# Patient Record
Sex: FEMALE | Race: WHITE | URBAN - METROPOLITAN AREA
[De-identification: names, ages, dates, MRNs, and addresses within clinical notes are randomized per-mention and may not be internally consistent; named-entity substitution may affect disease eponyms.]

---

## 2018-01-18 ENCOUNTER — TRANSFERRED RECORDS (OUTPATIENT)
Dept: HEALTH INFORMATION MANAGEMENT | Facility: CLINIC | Age: 83
End: 2018-01-18

## 2018-01-19 PROCEDURE — 00000346 ZZHCL STATISTIC REVIEW OUTSIDE SLIDES TC 88321: Performed by: INTERNAL MEDICINE

## 2018-01-22 LAB — COPATH REPORT: NORMAL

## 2018-01-30 ENCOUNTER — OFFICE VISIT (OUTPATIENT)
Dept: PULMONOLOGY | Facility: CLINIC | Age: 83
End: 2018-01-30
Attending: INTERNAL MEDICINE
Payer: MEDICARE

## 2018-01-30 VITALS
SYSTOLIC BLOOD PRESSURE: 184 MMHG | RESPIRATION RATE: 16 BRPM | BODY MASS INDEX: 23.19 KG/M2 | HEIGHT: 62 IN | HEART RATE: 71 BPM | OXYGEN SATURATION: 96 % | WEIGHT: 126 LBS | DIASTOLIC BLOOD PRESSURE: 83 MMHG

## 2018-01-30 DIAGNOSIS — J47.9 BRONCHIECTASIS WITHOUT COMPLICATION (H): Primary | ICD-10-CM

## 2018-01-30 DIAGNOSIS — J47.9 BRONCHIECTASIS WITHOUT COMPLICATION (H): ICD-10-CM

## 2018-01-30 DIAGNOSIS — R05.9 COUGH: Primary | ICD-10-CM

## 2018-01-30 LAB
6 MIN WALK (FT): 1030 FT
6 MIN WALK (M): 314 M
BASOPHILS # BLD AUTO: 0 10E9/L (ref 0–0.2)
BASOPHILS NFR BLD AUTO: 0.5 %
CK SERPL-CCNC: 80 U/L (ref 30–225)
CRP SERPL-MCNC: 8.5 MG/L (ref 0–8)
DIFFERENTIAL METHOD BLD: NORMAL
EOSINOPHIL # BLD AUTO: 0.1 10E9/L (ref 0–0.7)
EOSINOPHIL NFR BLD AUTO: 1.2 %
ERYTHROCYTE [DISTWIDTH] IN BLOOD BY AUTOMATED COUNT: 14.8 % (ref 10–15)
ERYTHROCYTE [SEDIMENTATION RATE] IN BLOOD BY WESTERGREN METHOD: 61 MM/H (ref 0–30)
HCT VFR BLD AUTO: 36.9 % (ref 35–47)
HGB BLD-MCNC: 11.7 G/DL (ref 11.7–15.7)
IMM GRANULOCYTES # BLD: 0 10E9/L (ref 0–0.4)
IMM GRANULOCYTES NFR BLD: 0.3 %
LYMPHOCYTES # BLD AUTO: 1.6 10E9/L (ref 0.8–5.3)
LYMPHOCYTES NFR BLD AUTO: 21.8 %
MCH RBC QN AUTO: 28.1 PG (ref 26.5–33)
MCHC RBC AUTO-ENTMCNC: 31.7 G/DL (ref 31.5–36.5)
MCV RBC AUTO: 89 FL (ref 78–100)
MONOCYTES # BLD AUTO: 0.6 10E9/L (ref 0–1.3)
MONOCYTES NFR BLD AUTO: 7.5 %
NEUTROPHILS # BLD AUTO: 5 10E9/L (ref 1.6–8.3)
NEUTROPHILS NFR BLD AUTO: 68.7 %
NRBC # BLD AUTO: 0 10*3/UL
NRBC BLD AUTO-RTO: 0 /100
PLATELET # BLD AUTO: 224 10E9/L (ref 150–450)
RBC # BLD AUTO: 4.16 10E12/L (ref 3.8–5.2)
WBC # BLD AUTO: 7.3 10E9/L (ref 4–11)

## 2018-01-30 PROCEDURE — 87449 NOS EACH ORGANISM AG IA: CPT | Performed by: INTERNAL MEDICINE

## 2018-01-30 PROCEDURE — 86140 C-REACTIVE PROTEIN: CPT | Performed by: INTERNAL MEDICINE

## 2018-01-30 PROCEDURE — 82784 ASSAY IGA/IGD/IGG/IGM EACH: CPT | Performed by: INTERNAL MEDICINE

## 2018-01-30 PROCEDURE — 86235 NUCLEAR ANTIGEN ANTIBODY: CPT | Performed by: INTERNAL MEDICINE

## 2018-01-30 PROCEDURE — 86038 ANTINUCLEAR ANTIBODIES: CPT | Performed by: INTERNAL MEDICINE

## 2018-01-30 PROCEDURE — 86480 TB TEST CELL IMMUN MEASURE: CPT | Performed by: INTERNAL MEDICINE

## 2018-01-30 PROCEDURE — 86001 ALLERGEN SPECIFIC IGG: CPT | Performed by: INTERNAL MEDICINE

## 2018-01-30 PROCEDURE — 86431 RHEUMATOID FACTOR QUANT: CPT | Performed by: INTERNAL MEDICINE

## 2018-01-30 PROCEDURE — 86606 ASPERGILLUS ANTIBODY: CPT | Mod: 91 | Performed by: INTERNAL MEDICINE

## 2018-01-30 PROCEDURE — 82550 ASSAY OF CK (CPK): CPT | Performed by: INTERNAL MEDICINE

## 2018-01-30 PROCEDURE — 85025 COMPLETE CBC W/AUTO DIFF WBC: CPT | Performed by: INTERNAL MEDICINE

## 2018-01-30 PROCEDURE — 87305 ASPERGILLUS AG IA: CPT | Performed by: INTERNAL MEDICINE

## 2018-01-30 PROCEDURE — 82785 ASSAY OF IGE: CPT | Performed by: INTERNAL MEDICINE

## 2018-01-30 PROCEDURE — 82787 IGG 1 2 3 OR 4 EACH: CPT | Performed by: INTERNAL MEDICINE

## 2018-01-30 PROCEDURE — 85652 RBC SED RATE AUTOMATED: CPT | Performed by: INTERNAL MEDICINE

## 2018-01-30 PROCEDURE — 36415 COLL VENOUS BLD VENIPUNCTURE: CPT | Performed by: INTERNAL MEDICINE

## 2018-01-30 PROCEDURE — 86331 IMMUNODIFFUSION OUCHTERLONY: CPT | Performed by: INTERNAL MEDICINE

## 2018-01-30 PROCEDURE — 86003 ALLG SPEC IGE CRUDE XTRC EA: CPT | Performed by: INTERNAL MEDICINE

## 2018-01-30 PROCEDURE — 86255 FLUORESCENT ANTIBODY SCREEN: CPT | Performed by: INTERNAL MEDICINE

## 2018-01-30 PROCEDURE — 86635 COCCIDIOIDES ANTIBODY: CPT | Performed by: INTERNAL MEDICINE

## 2018-01-30 PROCEDURE — 86612 BLASTOMYCES ANTIBODY: CPT | Performed by: INTERNAL MEDICINE

## 2018-01-30 PROCEDURE — 82085 ASSAY OF ALDOLASE: CPT | Performed by: INTERNAL MEDICINE

## 2018-01-30 PROCEDURE — 86256 FLUORESCENT ANTIBODY TITER: CPT | Performed by: INTERNAL MEDICINE

## 2018-01-30 PROCEDURE — G0463 HOSPITAL OUTPT CLINIC VISIT: HCPCS | Mod: ZF

## 2018-01-30 PROCEDURE — 86200 CCP ANTIBODY: CPT | Performed by: INTERNAL MEDICINE

## 2018-01-30 PROCEDURE — 86698 HISTOPLASMA ANTIBODY: CPT | Performed by: INTERNAL MEDICINE

## 2018-01-30 PROCEDURE — 86606 ASPERGILLUS ANTIBODY: CPT | Performed by: INTERNAL MEDICINE

## 2018-01-30 RX ORDER — ALBUTEROL SULFATE 90 UG/1
2 AEROSOL, METERED RESPIRATORY (INHALATION) EVERY 6 HOURS
COMMUNITY

## 2018-01-30 RX ORDER — CALCIUM CARBONATE 500(1250)
1 TABLET ORAL 3 TIMES DAILY
COMMUNITY

## 2018-01-30 RX ORDER — DESOXIMETASONE 0.5 MG/G
GEL TOPICAL PRN
COMMUNITY

## 2018-01-30 RX ORDER — AZITHROMYCIN 250 MG/1
TABLET, FILM COATED ORAL
Qty: 6 TABLET | Refills: 0 | Status: SHIPPED | OUTPATIENT
Start: 2018-01-30 | End: 2018-03-08

## 2018-01-30 ASSESSMENT — PAIN SCALES - GENERAL: PAINLEVEL: NO PAIN (0)

## 2018-01-30 NOTE — PROGRESS NOTES
Reason for Visit  Ayala Dawn is a 85 year old year old female who is being seen for Consult (ILD)    ILD HPI    Aayla Dawn is an 85-year-old female seen today for possible interstitial lung disease.  Her history is that she presented 1 year ago with episode of what sounds like quite significant hemoptysis.  This was on February 28 of 2017.  She was admitted to the hospital and underwent evaluation for this.  CT scan at that time showed some patchy upper lobe infiltrates with some cystic lesions/bronchiectasis in the right upper lobe.  She underwent bronchoscopy which apparently did show some blood from the right upper lobe however the cultures were all negative.  The patient was on Plavix at this time which she continues.  She was also found to have 2 small peripheral pulmonary emboli for which she was briefly placed on the Lovenox however this was discontinued and she did not receive a full course of anticoagulation.  Because of the concern of possible fungal infection she was also given a course of voriconazole although it is unclear how long she was treated for it does not sound like it was more than 1-2 weeks at most.  She also was given a course of prednisone started at 50 mg and tapered off over about 2 months.  Her CT scan continued to show abnormalities and so ultimately she underwent a surgical lung biopsy which demonstrated necrotizing granulomatous inflammation with no evidence of fungal or mycobacterial organisms on stains.  Since then the patient has not had any recurrence of her hemoptysis.  She has had minimal cough although she does note over the last couple weeks it is increased and productive of yellowish turning to green sputum.  She has dyspnea with any significant exertion.  She is not hypoxic and does not require oxygen.  She also notes significant fatigue again this has been present for about 1 year has not really improved.  She was given an albuterol inhaler which she used rarely and  does not feel it provided any significant relief.    Past medical history is reviewed with the patient she has hypertension and is on Plavix for possible small cerebrovascular lesion.  She has history of gallbladder resection hysterectomy and appendectomy.  She does note that as a child she had bronchitis every winter.  She grew up in [place] during the war.    Social history the patient currently lives in a house in [place] with her  there are no pets or birds in the home.  There is no hot tub exposure.  She has about a 20-pack-year history of smoking she quit smoking in 1973.  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  Also as mentioned above grew up in [place] during the war and was exposed to vomiting.  No other obvious exposures.      Current Outpatient Prescriptions   Medication     albuterol (PROAIR HFA/PROVENTIL HFA/VENTOLIN HFA) 108 (90 BASE) MCG/ACT Inhaler     METOPROLOL SUCCINATE ER PO     RANITIDINE HCL PO     Cyanocobalamin (B-12) 1000 MCG CAPS     Ferrous Sulfate (IRON SUPPLEMENT PO)     ATORVASTATIN CALCIUM PO     desoximetasone (TOPICORT) 0.05 % GEL     CLOPIDOGREL BISULFATE PO     calcium carbonate (OS-VIDAL 500 MG [place]. CA) 1250 MG tablet     MAGNESIUM OXIDE PO     azithromycin (ZITHROMAX) 250 MG tablet     No current facility-administered medications for this visit.      Allergies   Allergen Reactions     Phenobarbital Hives     Past Medical History:   Diagnosis Date     HTN (hypertension)        Past Surgical History:   Procedure Laterality Date     APPENDECTOMY       choleycystectomy       HYSTERECTOMY         Social History     Social History     Marital status:      Spouse name: N/A     Number of children: N/A     Years of education: N/A     Occupational History     Not on file.     Social History Main Topics     Smoking status: Former Smoker     Quit date: 1/1/1973     Smokeless tobacco:  "Never Used     Alcohol use Yes      Comment: rare     Drug use: No     Sexual activity: Not on file     Other Topics Concern     Not on file     Social History Narrative     No narrative on file       Family History   Problem Relation Age of Onset     Asthma Mother        ROS Pulmonary    A complete ROS was otherwise negative except as noted in the HPI.  Vitals:    01/30/18 1440   BP: 184/83   Pulse: 71   Resp: 16   SpO2: 96%   Weight: 57.2 kg (126 lb)   Height: 1.575 m (5' 2\")     Exam:   GENERAL APPEARANCE: Well developed, well nourished, alert, and in no apparent distress.  NECK: supple, no masses, no thyromegaly.  LYMPHATICS: No significant axillary, cervical, or supraclavicular nodes.  RESP: decreased air entry with diffuse crackles, no wheezing  CV: Normal S1, S2, regular rhythm, normal rate, no rub, no murmur,  no gallop, no LE edema.   ABDOMEN:  Bowel sounds normal, soft, nontender, no HSM or masses.   MS: extremities normal- no clubbing, no cyanosis.  SKIN: no rash on limited exam  NEURO: Mentation intact, speech normal, normal strength and tone, normal gait and stance  PSYCH: mentation appears normal. and affect normal/bright  Results: I have reviewed all imaging, PFTs and other relavent tests, please see below for details, PFT and imaging results were reviewed with the patient.  PFTs: Mild to moderate restriction, normal DLCO, appears stable from 4/2017  Chest CT: Several chest CTs reviewed by me demonstrate mostly upper lobe patchy migratory infiltrates with fairly extensive cystic bronchiectasis in the right upper lobe.  This appears to be relatively stable over the past 6 months.  Assessment and plan:     85-year-old female with episode of hemoptysis right upper lobe cystic bronchiectasis and some patchy infiltrates.  With a biopsy showing necrotizing granulomatous inflammation.  Currently she is moderately symptomatic with a cough that is productive although only the last couple weeks.  She has " significant dyspnea on exertion.  The etiology of her lung disease is obviously not clear at this point however I am most concerned still about an infectious process despite the negative workup.  We will do a standard I will do workup as well as a serologic evaluation for infection including fungal and mycobacterial infection.  We will obtain the biopsy slides and will review her case in our multidisciplinary ILD conference.  As I discussed with the patient things do appear relatively stable and therefore I do not think there is any urgency.  We will await the results of the workup however moving forward I think that possible courses of action would include repeat bronchoscopy to further evaluate for infection versus empiric course of antifungal therapy versus close observation.  I did inform the patient that if she were to develop significant hemoptysis again that she should go to the hospital and she should be transferred to the [facility] for consideration of IR embolization of her right upper lobe.    We will obtain the above-mentioned studies and get her pathology and review her case in our ILD conference and I will follow-up with the patient after that.  We will schedule her for 2 month follow-up with pulmonary function tests where we can review the results of her evaluation at that time.  I also will give the patient a cup for a sputum sample to check for routine fungal and AFB given the change in her sputum recently.  Also will have her take a course of azithromycin after she has produced a sputum culture.    CBC   No results for input(s): RBC, HGB, HCT, PLT in the last 76432 hours.    Invalid input(s): WBS    Basic Metabolic Panel  No results for input(s): NA, POTASSIUM, CHLORIDE, CO2, BUN, CT, GLC, VIDAL in the last 70234 hours.    INR  No results for input(s): INR in the last 70405 hours.    PFT  PFT Latest Ref Rng & Units 1/30/2018   FVC L 1.43   FEV1 L 1.06   FVC% % 63   FEV1% %  62           CC:

## 2018-01-30 NOTE — LETTER
1/30/2018       RE: Ayala Dawn  [address]  [facility] [address]     Dear Colleague,    Thank you for referring your patient, Ayala Dawn, to the [facility] at [facility]. Please see a copy of my visit note below.    Reason for Visit  Ayala Dawn is a 85 year old year old female who is being seen for Consult (ILD)    ILD HPI    Ayala Dawn is an 85-year-old female seen today for possible interstitial lung disease.  Her history is that she presented 1 year ago with episode of what sounds like quite significant hemoptysis.  This was on February 28 of 2017.  She was admitted to the hospital and underwent evaluation for this.  CT scan at that time showed some patchy upper lobe infiltrates with some cystic lesions/bronchiectasis in the right upper lobe.  She underwent bronchoscopy which apparently did show some blood from the right upper lobe however the cultures were all negative.  The patient was on Plavix at this time which she continues.  She was also found to have 2 small peripheral pulmonary emboli for which she was briefly placed on the Lovenox however this was discontinued and she did not receive a full course of anticoagulation.  Because of the concern of possible fungal infection she was also given a course of voriconazole although it is unclear how long she was treated for it does not sound like it was more than 1-2 weeks at most.  She also was given a course of prednisone started at 50 mg and tapered off over about 2 months.  Her CT scan continued to show abnormalities and so ultimately she underwent a surgical lung biopsy which demonstrated necrotizing granulomatous inflammation with no evidence of fungal or mycobacterial organisms on stains.  Since then the patient has not had any recurrence of her hemoptysis.  She has had minimal cough although she does note over the last couple weeks it is increased and  productive of yellowish turning to green sputum.  She has dyspnea with any significant exertion.  She is not hypoxic and does not require oxygen.  She also notes significant fatigue again this has been present for about 1 year has not really improved.  She was given an albuterol inhaler which she used rarely and does not feel it provided any significant relief.    Past medical history is reviewed with the patient she has hypertension and is on Plavix for possible small cerebrovascular lesion.  She has history of gallbladder resection hysterectomy and appendectomy.  She does note that as a child she had bronchitis every winter.  She grew up in [place] during the war.    Social history the patient currently lives in a house in [place] with her  there are no pets or birds in the home.  There is no hot tub exposure.  She has about a 20-pack-year history of smoking she quit smoking in 1973.  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  Also as mentioned above grew up in [place] during the war and was exposed to vomiting.  No other obvious exposures.      Current Outpatient Prescriptions   Medication     albuterol (PROAIR HFA/PROVENTIL HFA/VENTOLIN HFA) 108 (90 BASE) MCG/ACT Inhaler     METOPROLOL SUCCINATE ER PO     RANITIDINE HCL PO     Cyanocobalamin (B-12) 1000 MCG CAPS     Ferrous Sulfate (IRON SUPPLEMENT PO)     ATORVASTATIN CALCIUM PO     desoximetasone (TOPICORT) 0.05 % GEL     CLOPIDOGREL BISULFATE PO     calcium carbonate (OS-VIDAL 500 MG [place]. CA) 1250 MG tablet     MAGNESIUM OXIDE PO     azithromycin (ZITHROMAX) 250 MG tablet     No current facility-administered medications for this visit.      Allergies   Allergen Reactions     Phenobarbital Hives     Past Medical History:   Diagnosis Date     HTN (hypertension)        Past Surgical History:   Procedure Laterality Date     APPENDECTOMY       choleycystectomy        "HYSTERECTOMY         Social History     Social History     Marital status:      Spouse name: N/A     Number of children: N/A     Years of education: N/A     Occupational History     Not on file.     Social History Main Topics     Smoking status: Former Smoker     Quit date: 1/1/1973     Smokeless tobacco: Never Used     Alcohol use Yes      Comment: rare     Drug use: No     Sexual activity: Not on file     Other Topics Concern     Not on file     Social History Narrative     No narrative on file       Family History   Problem Relation Age of Onset     Asthma Mother        ROS Pulmonary    A complete ROS was otherwise negative except as noted in the HPI.  Vitals:    01/30/18 1440   BP: 184/83   Pulse: 71   Resp: 16   SpO2: 96%   Weight: 57.2 kg (126 lb)   Height: 1.575 m (5' 2\")     Exam:   GENERAL APPEARANCE: Well developed, well nourished, alert, and in no apparent distress.  NECK: supple, no masses, no thyromegaly.  LYMPHATICS: No significant axillary, cervical, or supraclavicular nodes.  RESP: decreased air entry with diffuse crackles, no wheezing  CV: Normal S1, S2, regular rhythm, normal rate, no rub, no murmur,  no gallop, no LE edema.   ABDOMEN:  Bowel sounds normal, soft, nontender, no HSM or masses.   MS: extremities normal- no clubbing, no cyanosis.  SKIN: no rash on limited exam  NEURO: Mentation intact, speech normal, normal strength and tone, normal gait and stance  PSYCH: mentation appears normal. and affect normal/bright  Results: I have reviewed all imaging, PFTs and other relavent tests, please see below for details, PFT and imaging results were reviewed with the patient.  PFTs: Mild to moderate restriction, normal DLCO, appears stable from 4/2017  Chest CT: Several chest CTs reviewed by me demonstrate mostly upper lobe patchy migratory infiltrates with fairly extensive cystic bronchiectasis in the right upper lobe.  This appears to be relatively stable over the past 6 months.  Assessment " and plan:     85-year-old female with episode of hemoptysis right upper lobe cystic bronchiectasis and some patchy infiltrates.  With a biopsy showing necrotizing granulomatous inflammation.  Currently she is moderately symptomatic with a cough that is productive although only the last couple weeks.  She has significant dyspnea on exertion.  The etiology of her lung disease is obviously not clear at this point however I am most concerned still about an infectious process despite the negative workup.  We will do a standard I will do workup as well as a serologic evaluation for infection including fungal and mycobacterial infection.  We will obtain the biopsy slides and will review her case in our multidisciplinary ILD conference.  As I discussed with the patient things do appear relatively stable and therefore I do not think there is any urgency.  We will await the results of the workup however moving forward I think that possible courses of action would include repeat bronchoscopy to further evaluate for infection versus empiric course of antifungal therapy versus close observation.  I did inform the patient that if she were to develop significant hemoptysis again that she should go to the hospital and she should be transferred to the [facility] for consideration of IR embolization of her right upper lobe.    We will obtain the above-mentioned studies and get her pathology and review her case in our ILD conference and I will follow-up with the patient after that.  We will schedule her for 2 month follow-up with pulmonary function tests where we can review the results of her evaluation at that time.  I also will give the patient a cup for a sputum sample to check for routine fungal and AFB given the change in her sputum recently.  Also will have her take a course of azithromycin after she has produced a sputum culture.    CBC   No results for input(s): RBC, HGB, HCT, PLT in the last 18538  hours.    Invalid input(s): WBS    Basic Metabolic Panel  No results for input(s): NA, POTASSIUM, CHLORIDE, CO2, BUN, CT, GLC, VIDAL in the last 29091 hours.    INR  No results for input(s): INR in the last 06408 hours.    PFT  PFT Latest Ref Rng & Units 1/30/2018   FVC L 1.43   FEV1 L 1.06   FVC% % 63   FEV1% % 62       Again, thank you for allowing me to participate in the care of your patient.      Sincerely,    David Morris Perlman, MD

## 2018-01-30 NOTE — MR AVS SNAPSHOT
After Visit Summary   1/30/2018    Ayala Dawn    MRN: [id number]           Patient Information     Date Of Birth          12/21/1932        Visit Information        Provider Department      1/30/2018 3:00 PM Perlman, David Morris, MD [facility]        Today's Diagnoses     Bronchiectasis without complication (H)    -  1       Follow-ups after your visit        Your next 10 appointments already scheduled     Jan 30, 2018  4:15 PM CST   Lab with  LAB    Health Lab ([facility])    [address] [place] [id number] [phone]              Future tests that were ordered for you today     Open Future Orders        Priority Expected Expires Ordered    General PFT Lab (Please always keep checked) Routine  1/30/2019 1/30/2018    Pulmonary Function Test Routine  1/30/2019 1/30/2018    Aspergillus Galactomannan Antigen Routine 1/30/2018 2/2/2018 1/30/2018    Fungal antibodies Routine 1/30/2018 2/2/2018 1/30/2018    M Tuberculosis by Quantiferon Routine 1/30/2018 2/2/2018 1/30/2018    Allergen aspergillus fumigatus IgE Routine 1/30/2018 2/2/2018 1/30/2018    IgE Routine 1/30/2018 2/2/2018 1/30/2018    1,3 Beta D glucan fungitell Routine 1/30/2018 2/2/2018 1/30/2018    Anti Nuclear Susan IgG by IFA with Reflex Routine 1/30/2018 2/2/2018 1/30/2018    Erythrocyte sedimentation rate auto Routine 1/30/2018 2/2/2018 1/30/2018    CRP inflammation Routine 1/30/2018 2/2/2018 1/30/2018    CK total Routine 1/30/2018 2/2/2018 1/30/2018    Farrah 1 Antibody IgG Routine 1/30/2018 2/2/2018 1/30/2018    Scleroderma Antibody Scl70 MARIANGEL IgG Routine 1/30/2018 2/2/2018 1/30/2018    Rheumatoid factor Routine 1/30/2018 2/2/2018 1/30/2018    SSA Ro MARIANGEL Antibody IgG Routine 1/30/2018 2/2/2018 1/30/2018    SSB La MARIANGEL Antibody IgG Routine 1/30/2018 2/2/2018 1/30/2018    Aldolase Routine 1/30/2018/2018 2/2/2018 1/30/2018    Hypersensitivity pneumonitis Routine  "1/30/2018 2/2/2018 1/30/2018    Hypersens Pneumonitis - Farmer's Lung II Routine 1/30/2018 2/2/2018 1/30/2018    Antineutrophil cytoplasmic Susan IgG Routine 1/30/2018 2/2/2018 1/30/2018    IgG Subclasses Routine 1/30/2018 2/2/2018 1/30/2018    Cyclic Citrullinated Peptide Antibody IgG Routine 1/30/2018 2/2/2018 1/30/2018    CBC with platelets and differential Routine 1/30/2018 2/2/2018 1/30/2018    Allergen Aspergillus Fumigatus IgG Routine 1/30/2018 2/2/2018 1/30/2018    Sputum Culture Aerobic Bacterial Routine  3/1/2018 1/30/2018    Gram stain Routine  3/31/2018 1/30/2018            Who to contact     If you have questions or need follow up information about today's clinic visit or your schedule please contact [facility] directly at [phone].  Normal or non-critical lab and imaging results will be communicated to you by [employer], letter or phone within 4 business days after the clinic has received the results. If you do not hear from us within 7 days, please contact the clinic through [employer] or phone. If you have a critical or abnormal lab result, we will notify you by phone as soon as possible.  Submit refill requests through [employer] or call your pharmacy and they will forward the refill request to us. Please allow 3 business days for your refill to be completed.          Additional Information About Your Visit        [employer] Information     [employer] lets you send messages to your doctor, view your test results, renew your prescriptions, schedule appointments and more. To sign up, go to [email or web address] . Click on \"Log in\" on the left side of the screen, which will take you to the Welcome page. Then click on \"Sign up Now\" on the right side of the page.     You will be asked to enter the access code listed below, as well as some personal information. Please follow the directions to create your username and password.     Your access code is: [email or web address]  Expires: 4/16/2018 " " 6:30 AM     Your access code will  in 90 days. If you need help or a new code, please call your [facility] [place] clinic or [phone].        Care EveryWhere ID     This is your Care EveryWhere ID. This could be used by other organizations to access your [facility] medical records  [id number]        Your Vitals Were     Pulse Respirations Height Pulse Oximetry BMI (Body Mass Index)       71 16 1.575 m (5' 2\") 96% 23.05 kg/m2        Blood Pressure from Last 3 Encounters:   18 184/83    Weight from Last 3 Encounters:   18 57.2 kg (126 lb)              We Performed the Following     AFB Culture Non Blood     Fungus Culture, non-blood          Today's Medication Changes          These changes are accurate as of 18  4:04 PM.  If you have any questions, ask your nurse or doctor.               Start taking these medicines.        Dose/Directions    azithromycin 250 MG tablet   Commonly known as:  ZITHROMAX   Used for:  Bronchiectasis without complication (H)   Started by:  Perlman, David Morris, MD        Take 2 tabs on day one and then 1 tab daily for the next 4 days.   Quantity:  6 tablet   Refills:  0            Where to get your medicines      These medications were sent to [facility] - [address] [place] [address]     Phone:  [phone]     azithromycin 250 MG tablet                Primary Care Provider Office Phone # Fax #    Jackson Pisano [phone]       [facility] [address] [place] [address]        Equal Access to Services     MARCELO MONTANO AH: Hadii shemar cardona hadasho Sonatalya, waaxda luqadaha, qaybta kaalmada noelegyada, alex chan. So [facility] [phone].    ATENCIÓN: Si habla español, tiene a kwong disposición servicios gratuitos de asistencia lingüística. Llame al [phone].    We comply with applicable federal civil rights laws and [place] laws. We " do not discriminate on the basis of race, color, national origin, age, disability, sex, sexual orientation, or gender identity.            Thank you!     Thank you for choosing [facility]  for your care. Our goal is always to provide you with excellent care. Hearing back from our patients is one way we can continue to improve our services. Please take a few minutes to complete the written survey that you may receive in the mail after your visit with us. Thank you!             Your Updated Medication List - Protect others around you: Learn how to safely use, store and throw away your medicines at www.disposemymeds.org.          This list is accurate as of 1/30/18  4:04 PM.  Always use your most recent med list.                   Brand Name Dispense Instructions for use Diagnosis    albuterol 108 (90 BASE) MCG/ACT Inhaler    PROAIR HFA/PROVENTIL HFA/VENTOLIN HFA     Inhale 2 puffs into the lungs every 6 hours        ATORVASTATIN CALCIUM PO      Take 20 mg by mouth        azithromycin 250 MG tablet    ZITHROMAX    6 tablet    Take 2 tabs on day one and then 1 tab daily for the next 4 days.    Bronchiectasis without complication (H)       B-12 1000 MCG Caps           calcium carbonate 1250 MG tablet    OS-VIDAL 500 mg [place]. Ca     Take 1 tablet by mouth 2 times daily        CLOPIDOGREL BISULFATE PO      Take 75 mg by mouth        desoximetasone 0.05 % Gel    TOPICORT          IRON SUPPLEMENT PO      Take 325 mg by mouth        MAGNESIUM OXIDE PO           METOPROLOL SUCCINATE ER PO      Take 100 mg by mouth daily        RANITIDINE HCL PO      Take 150 mg by mouth

## 2018-01-31 LAB
A FUMIGATUS IGE QN: <0.1 KU(A)/L
ANA SER QL IF: NEGATIVE
CCP AB SER IA-ACNC: 3 U/ML
ENA JO1 IGG SER-ACNC: <0.2 AI (ref 0–0.9)
ENA SCL70 IGG SER IA-ACNC: 0.7 AI (ref 0–0.9)
ENA SS-A IGG SER IA-ACNC: <0.2 AI (ref 0–0.9)
ENA SS-B IGG SER IA-ACNC: <0.2 AI (ref 0–0.9)
RHEUMATOID FACT SER NEPH-ACNC: <20 IU/ML (ref 0–20)

## 2018-02-01 LAB
ALDOLASE SERPL-CCNC: 3 U/L (ref 1.5–8.1)
IGE SERPL-ACNC: 32 KIU/L (ref 0–114)
M TB TUBERC IFN-G BLD QL: POSITIVE
M TB TUBERC IFN-G/MITOGEN IGNF BLD: 1.52 IU/ML

## 2018-02-02 ENCOUNTER — TRANSFERRED RECORDS (OUTPATIENT)
Dept: HEALTH INFORMATION MANAGEMENT | Facility: CLINIC | Age: 83
End: 2018-02-02

## 2018-02-02 LAB
1,3 BETA GLUCAN SER-MCNC: <31 PG/ML
B-D GLUCAN INTERPRETATION (1,3): NEGATIVE
DLCOUNC-%PRED-PRE: 84 %
DLCOUNC-PRE: 15.23 ML/MIN/MMHG
DLCOUNC-PRED: 18.02 ML/MIN/MMHG
ERV-%PRED-PRE: 83 %
ERV-PRE: 0.47 L
ERV-PRED: 0.56 L
EXPTIME-PRE: 8 SEC
FEF2575-%PRED-POST: 31 %
FEF2575-%PRED-PRE: 54 %
FEF2575-POST: 0.43 L/SEC
FEF2575-PRE: 0.74 L/SEC
FEF2575-PRED: 1.36 L/SEC
FEFMAX-%PRED-PRE: 67 %
FEFMAX-PRE: 2.68 L/SEC
FEFMAX-PRED: 3.97 L/SEC
FEV1-%PRED-PRE: 62 %
FEV1-PRE: 1.06 L
FEV1FEV6-PRE: 77 %
FEV1FEV6-PRED: 77 %
FEV1FVC-PRE: 74 %
FEV1FVC-PRED: 77 %
FEV1SVC-PRE: 77 %
FEV1SVC-PRED: 68 %
FIFMAX-PRE: 3.02 L/SEC
FRCPLETH-%PRED-PRE: 93 %
FRCPLETH-PRE: 2.45 L
FRCPLETH-PRED: 2.61 L
FVC-%PRED-PRE: 63 %
FVC-PRE: 1.43 L
FVC-PRED: 2.24 L
GALACTOMANNAN AG SERPL QL IA: NEGATIVE
GALACTOMANNAN AG SERPL-ACNC: 0.03
IC-%PRED-PRE: 46 %
IC-PRE: 0.91 L
IC-PRED: 1.93 L
IGG SERPL-MCNC: 1280 MG/DL (ref 695–1620)
IGG1 SER-MCNC: 615 MG/DL (ref 300–856)
IGG2 SER-MCNC: 400 MG/DL (ref 158–761)
IGG3 SER-MCNC: 131 MG/DL (ref 24–192)
IGG4 SER-MCNC: 213 MG/DL (ref 11–86)
RVPLETH-%PRED-PRE: 89 %
RVPLETH-PRE: 1.98 L
RVPLETH-PRED: 2.21 L
TLCPLETH-%PRED-PRE: 72 %
TLCPLETH-PRE: 3.36 L
TLCPLETH-PRED: 4.6 L
VA-%PRED-PRE: 66 %
VA-PRE: 3.17 L
VC-%PRED-PRE: 55 %
VC-PRE: 1.38 L
VC-PRED: 2.49 L

## 2018-02-04 LAB
A FLAVUS AB SER QL ID: ABNORMAL
A FUMIGATUS1 AB SER QL ID: NORMAL
A FUMIGATUS2 AB SER QL: ABNORMAL
A FUMIGATUS3 AB SER QL ID: DETECTED
A FUMIGATUS6 AB SER QL ID: NORMAL
A PULLULANS AB SER QL ID: NORMAL
ANCA IGG TITR SER IF: ABNORMAL {TITER}
LACEYELLA SACCHARI AB SER QL: ABNORMAL
PIGEON DROP IGE QN: NORMAL
S RECTIVIRGULA AB SER QL ID: NORMAL
S VIRIDIS AB SER QL: ABNORMAL
T CANDIDUS AB SER QL: ABNORMAL
T VULGARIS AB SER QL ID: NORMAL

## 2018-02-05 LAB — LAB SCANNED RESULT: NORMAL

## 2018-02-06 ENCOUNTER — CARE COORDINATION (OUTPATIENT)
Dept: PULMONOLOGY | Facility: CLINIC | Age: 83
End: 2018-02-06

## 2018-02-06 DIAGNOSIS — A31.9 MYCOBACTERIUM INFECTION: Primary | ICD-10-CM

## 2018-02-06 DIAGNOSIS — R93.89 ABNORMAL CHEST CT: Primary | ICD-10-CM

## 2018-02-06 LAB — A FUMIGATUS IGG SER-MCNC: 55.1 MCG/ML

## 2018-02-06 NOTE — PROGRESS NOTES
Received sputum culture result for mycobacterium from [facility].  Acid fast smear for mycobacterium positive, many.  Discussed with Dr. Perlman who would like to run Mtb complex by Rapid PCR test on the sputum sample and to have patient be seen by our ID department.  Discussed plan with patient who is in agreement with plan.

## 2018-03-08 ENCOUNTER — OFFICE VISIT (OUTPATIENT)
Dept: INFECTIOUS DISEASES | Facility: CLINIC | Age: 83
End: 2018-03-08
Attending: INTERNAL MEDICINE
Payer: MEDICARE

## 2018-03-08 VITALS
HEIGHT: 62 IN | BODY MASS INDEX: 22.93 KG/M2 | RESPIRATION RATE: 20 BRPM | WEIGHT: 124.6 LBS | TEMPERATURE: 98 F | HEART RATE: 66 BPM | DIASTOLIC BLOOD PRESSURE: 75 MMHG | SYSTOLIC BLOOD PRESSURE: 196 MMHG | OXYGEN SATURATION: 96 %

## 2018-03-08 DIAGNOSIS — Z22.7 LATENT TUBERCULOSIS INFECTION: ICD-10-CM

## 2018-03-08 DIAGNOSIS — A31.8 MYCOBACTERIUM ABSCESSUS INFECTION: Primary | ICD-10-CM

## 2018-03-08 PROCEDURE — G0463 HOSPITAL OUTPT CLINIC VISIT: HCPCS | Mod: ZF

## 2018-03-08 ASSESSMENT — PAIN SCALES - GENERAL: PAINLEVEL: NO PAIN (0)

## 2018-03-08 NOTE — MR AVS SNAPSHOT
"              After Visit Summary   3/8/2018    Ayala Dawn    MRN: [id number]           Patient Information     Date Of Birth          12/21/1932        Visit Information        Provider Department      3/8/2018 12:30 PM Marcos Ruiz MD [facility] and Infectious Diseases        Today's Diagnoses     Mycobacterium abscessus infection    -  1    Latent tuberculosis infection           Follow-ups after your visit        Follow-up notes from your care team     Return if symptoms worsen or fail to improve.      Who to contact     If you have questions or need follow up information about today's clinic visit or your schedule please contact [facility] AND INFECTIOUS DISEASES directly at [phone].  Normal or non-critical lab and imaging results will be communicated to you by MyChart, letter or phone within 4 business days after the clinic has received the results. If you do not hear from us within 7 days, please contact the clinic through MyChart or phone. If you have a critical or abnormal lab result, we will notify you by phone as soon as possible.  Submit refill requests through [employer] or call your pharmacy and they will forward the refill request to us. Please allow 3 business days for your refill to be completed.          Additional Information About Your Visit        Care EveryWhere ID     This is your Care EveryWhere ID. This could be used by other organizations to access your [place] medical records  [id number]        Your Vitals Were     Pulse Temperature Respirations Height Pulse Oximetry BMI (Body Mass Index)    66 98  F (36.7  C) (Oral) 20 1.575 m (5' 2\") 96% 22.79 kg/m2       Blood Pressure from Last 3 Encounters:   03/08/18 196/75   01/30/18 184/83    Weight from Last 3 Encounters:   03/08/18 56.5 kg (124 lb 9.6 oz)   01/30/18 57.2 kg (126 lb)              Today, you had the following     No orders found for display       Primary Care Provider Office Phone # Fax #    " Yasmine Oakley [phone]       [facility] [address]  [place] [address]        Equal Access to Services     MARCELO MONTANO : Hadii aad ku hadbillieck Zavala, walinda rosalio, qasoheilata kaalmada gill, alex vanessain hayaaramila cohendennis duenas cam chan. So [facility] [phone].    ATENCIÓN: Si habla español, tiene a kwong disposición servicios gratuitos de asistencia lingüística. Llame al [phone].    We comply with applicable federal civil rights laws and [place] laws. We do not discriminate on the basis of race, color, national origin, age, disability, sex, sexual orientation, or gender identity.            Thank you!     Thank you for choosing [facility] AND INFECTIOUS DISEASES  for your care. Our goal is always to provide you with excellent care. Hearing back from our patients is one way we can continue to improve our services. Please take a few minutes to complete the written survey that you may receive in the mail after your visit with us. Thank you!             Your Updated Medication List - Protect others around you: Learn how to safely use, store and throw away your medicines at www.disposemymeds.org.          This list is accurate as of 3/8/18 11:59 PM.  Always use your most recent med list.                   Brand Name Dispense Instructions for use Diagnosis    albuterol 108 (90 BASE) MCG/ACT Inhaler    PROAIR HFA/PROVENTIL HFA/VENTOLIN HFA     Inhale 2 puffs into the lungs every 6 hours        ATORVASTATIN CALCIUM PO      Take 20 mg by mouth        B-12 1000 MCG Caps      Take 1 tablet by mouth daily        calcium carbonate 1250 MG tablet    OS-IVDAL 500 mg [place]. Ca     Take 1 tablet by mouth 3 times daily        CLOPIDOGREL BISULFATE PO      Take 75 mg by mouth        desoximetasone 0.05 % Gel    TOPICORT     Apply topically as needed (Rosea)        IRON SUPPLEMENT PO      Take 325 mg by mouth        MAGNESIUM OXIDE PO      Take 400 mg by mouth daily         METOPROLOL SUCCINATE ER PO      Take 100 mg by mouth daily        RANITIDINE HCL PO      Take 150 mg by mouth 2 times daily

## 2018-03-08 NOTE — NURSING NOTE
"Chief Complaint   Patient presents with     Consult     Mycobacterium infection       Initial /75 (BP Location: Right arm, Patient Position: Sitting, Cuff Size: Adult Regular)  Pulse 66  Temp 98  F (36.7  C) (Oral)  Resp 20  Ht 1.575 m (5' 2\")  Wt 56.5 kg (124 lb 9.6 oz)  SpO2 96%  BMI 22.79 kg/m2 Estimated body mass index is 22.79 kg/(m^2) as calculated from the following:    Height as of this encounter: 1.575 m (5' 2\").    Weight as of this encounter: 56.5 kg (124 lb 9.6 oz).  Medication Reconciliation: complete     Caryn Sweeney [facility]  3/8/2018 12:34 PM        "

## 2018-03-08 NOTE — PROGRESS NOTES
"  INFECTIOUS DISEASE CLINIC    REASON FOR VISIT:   Initial consultation for sputum culture with growth of Mycobacterium species    REFERRED BY:  Dr. David Perlman of Pulmonology    HISTORY OF PRESENT ILLNESS:   Ms. Ayala Dawn is a shannon 86 y/o woman originally from [place] with HTN and a small cerebrovascular lesion on Plavix.  She was seen by Dr. Perlman in Pulmonology on 1/30/2018 and referred to us for consultation given a positive AFB sputum culture.  She is here today accompanied by her daughter     Respiratory history began just over 1 year ago when patient experienced some vague dyspnea symptoms and then had a sudden occurrence of significant hemoptysis on 2/28/2017.  Patient was on Plavix at the time, and continues on this today. She was admitted to her local hospital system for a work up.  CT of the Chest showed a patchy upper lobe infiltrate with some cystic lesions/bronchiectasis in the right upper lobe.  She was put in respiratory isolation for M. tuberculosis evaluation.  Per the patient, there were \"three negative sputums,\" and she was released from isolation.  Otherwise sputum cultures did not grow any organisms. A bronchoscopy was performed and some bleeding was noted from the right upper lobe, and all of those cultures were negative as well.  Patient received prednisone therapy beginning at 50mg and tapered off over 2 months.  The CT Chest was repeated, and continued to show the abnormalities.  Therefore, a surgical lung biopsy was done and this demonstrated necrotizing granulomatous inflammation with stains negative for any signs of fungal or mycobacterial organisms.   She was referred here to the [facility], and to Dr. Perlman for potential interstitial lung disease.  Per Dr. Perlman's note, he was most concerned about an infectious process.       Dr. Perlman ordered sputum cultures along with an extensive work up on blood.  Blood work up was largely unremarkable.  Potentially important findings there " "include a positive ANCA IgG (but with atypical staining pattern), and somewhat elevated \"Allergen Aspergillus fumigatus IgG\" and detected A. fumigatus #3 antibody, with a negative Aspergillus galactomannan antigen.  Quantiferon Gold (IGRA for M. tuberculosis) was positive. Patient could not produce sputum in clinic, and so she eventually produced a specimen that was dropped off at her local [facility] and then sent to [facility] for work up.  Sputum specimen was collected 2/2/18.   AFB smear was reported as positive, many.  Mycobacterium tuberculosis PCR probe was not detected.  No organism had been identified at the time of Dr. Perlman's consultation request.    I called [facility] today 3/8/18, and was informed that there is an organism growing on the sputum AFB culture, with preliminary identification of Mycobacterium abscessus group.  No susceptibility testing has been set up as of yet.    Ms. Dawn tells me that overall she feels quite well.  She has some ongoing fatigue that she thinks has been present for the whole year and not really changed. She did not have any further hemoptysis since the initial presentation, that was until 2 weeks ago when she noticed a small amount of blood mixed with some sputum once.  She has a mild cough that was not always productive, but has become more so over time, with yellow or green sputum. She does not notice much shortness of breath, but it does occur occasionally.  When this does happen, she will sit down to rest and it will go away.  She does not feel this interferes much with her daily function.  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  She notes that her primary goals of care are to out-live her  such that she can remain his caregiver until his death. Otherwise, she feels quite satisfied with her life and does not have other unrealized goals or aspirations.  She denies any " fevers, chills, or night sweats.  She does not have a great appetite in general, but does think it is down further in the last few weeks.  Her weight is stable and remains in the range of 125-130 lbs for many years.     Exposure history:  Born in [place] and grew up there during wartime. Father had pulmonary Mycobacterium tuberculosis when she was a child.  Patient has a history of a positive PPD. When she was evaluated as a younger person, she had a normal chest XR and no symptoms.  She does not recall ever being offered treatment for latent TB.  She recalls developing at least one episode of bronchitis every winter as a younger person.  She does not recall having any prolonged pulmonary illnesses.   Ms. Dawn met her  and moved with him to [place] as a young woman.  Otherwise, she does not have extensive travel history. 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  She also enjoyed furniture stripping and refinishing as a hobby.    PAST MEDICAL HISTORY:    Past Medical History:   Diagnosis Date     HTN (hypertension)    Small cerebrovascular lesion, on chronic Plavix    PAST SURGICAL HISTORY:  Past Surgical History:   Procedure Laterality Date     APPENDECTOMY       choleycystectomy       HYSTERECTOMY       FAMILY PAST MEDICAL HISTORY:  Family History   Problem Relation Age of Onset     Asthma Mother      SOCIAL HISTORY:  Social History     Social History     Marital status:      Spouse name: N/A     Number of children: N/A     Years of education: N/A     Occupational History     Not on file.     Social History Main Topics     Smoking status: Former Smoker     Quit date: 1/1/1973     Smokeless tobacco: Never Used     Alcohol use Yes      Comment: rare     Drug use: No     Sexual activity: Not on file     Other Topics Concern     Not on file     Social History Narrative     Smoked for 20 years before quitting.     As above, lives with her  and is his primary caretaker.  Adult  children and grandchildren live nearby.    CURRENT MEDICATIONS:    Current Outpatient Prescriptions   Medication     albuterol (PROAIR HFA/PROVENTIL HFA/VENTOLIN HFA) 108 (90 BASE) MCG/ACT Inhaler     METOPROLOL SUCCINATE ER PO     RANITIDINE HCL PO     Cyanocobalamin (B-12) 1000 MCG CAPS     Ferrous Sulfate (IRON SUPPLEMENT PO)     ATORVASTATIN CALCIUM PO     desoximetasone (TOPICORT) 0.05 % GEL     CLOPIDOGREL BISULFATE PO     calcium carbonate (OS-VIDAL 500 MG [place]. CA) 1250 MG tablet     MAGNESIUM OXIDE PO     No current facility-administered medications for this visit.      ALLERGIES:    Allergies   Allergen Reactions     Phenobarbital Hives     REVIEW OF SYSTEMS: A full 12-point review of systems was obtained, pertinent positives and negatives as above.     PHYSICAL EXAMINATION:    VITAL SIGNS: reviewed  GENERAL:   No acute distress    HEENT: No icterus or injection. Oropharynx moist and clear without lesions or exudate.    NECK: Supple and nontender  LYMPH:  No cervical, axillary or inguinal lymphadenopathy  LUNGS: No increased work of breathing.  Crackles heard throughout the chest.  HEART: Regular rate and rhythm and no murmur, gallop or rub    ABDOMEN: Normoactive bowel sounds, soft, nontender, nondistended, no hepatosplenomegaly.    EXTREMITIES: Warm and well perfused without clubbing, cyanosis, or edema  SKIN:  No rashes  NEURO:  Awake, alert, no focal neurologic deficits.  PSYCH: Affect normal. Speech fluent and appropriate.     LABORATORY DATA:    Reviewed in EPIC.   Details in the HPI.    ASSESSMENT AND PLAN:   Ms. Ayala Dawn is an elderly woman at 85 years of age, and she has HTN and a small cerebrovascular lesion for which she is maintained on chronic Plavix.  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    (A31.9) Mycobacterium abscessus infection  (primary encounter diagnosis)    (R76.11) Latent tuberculosis infection    It is  important to note that Ms. Dawn had a comprehensive work up last year after sudden hemoptysis and findings of a right upper lobe infiltrate.  AFB smears, sputums, cultures from bronchoscopy, and cultures from biopsy of the right upper lobe lesion were all negative for any organisms.  Stains from pathology on the biopsy also did not reveal any organisms.  Now, over a year later, she is found to have many AFB positive organisms on AFB smear, and AFB culture is growing Mycobacterium abscessus.  Given this history, we cannot be sure that the current infection is what caused the symptoms over a year ago.  We may also consider an underlying lung diagnosis that left Ms. Dawn more susceptible to opportunistic infection.  Mycobacterium abscessus lung infection is associated with chronic lung disease.     That being said, there is history now of cough with worsening sputum production, and occasional shortness of breath.  The microbiology findings now are not subtle, and so we do believe that she has a current lung infection with Mycobacterium abscessus.  This may warrant treatment.  However, we must keep in mind that the organism is notoriously difficult to treat, requires 12 months or more of treatment, and most often with at least one intravenous antibiotic needed.  This depends on the subspecies and antibiotic susceptibility pattern, and whether or not there is inducible resistance to macrolides such as clarithromycin.  Surgical resection of the involved lung is also commonly needed.    We ordered susceptibility testing on the organism from [facility] today.  I expect this will take upwards of 2 weeks to return, as they will have to do specialized testing to look for the inducible resistance to macrolides.  In the meantime, the patient is stable and agrees to wait for this information before starting any therapy.     We also discussed her advanced age and goals of care.  This treatment course could be very  lengthy, with potential for IV antibiotic infusion and surgery and may interfere with her quality of life and ability to care for her .  Currently she is minimally symptomatic.  Careful monitoring for advancing symptoms may be a more preferable way to manage this infection.     The patient and her daughter were given materials to read, and an opportunity to ask questions.   We will contact them when we have the susceptibility tests completed and can describe the most optimal regimen. We will also see if we can design a regimen that will treat latent tuberculosis at the same time.  Once we can present this regimen to the patient and her family, the patient will decide if she chooses to proceed with treatment or not.  We would also want repeat sputum and CT of the Chest before starting any targeted antimicrobial therapy.    Patient's daughter's name is Tiana Singh and her emails is [email or web address].    Patient was seen and discussed with the Infectious Disease attending and clinic preceptor, Dr. Joyce Earl.      Marcos Ruiz MD    Fellow in Adult and Pediatric Infectious Disease    pager: [phone]    Attending Attestation:  I evaluated Ms. Dawn with fellow Dr. Ruiz.  I have reviewed pertinent labs, vitals and imaging results. This note reflects our joint findings, assessment and recommendations. >50% of this 60 minute visit was spent coordinating care with [facility] (where cultures were obtained) and counseling the patient and her daughter about her infectious disease issues.     Joyce Earl MD  [phone]

## 2018-03-08 NOTE — LETTER
"3/8/2018       RE: Ayala Dawn  [address]  [facility] [address]     Dear Colleague,    Thank you for referring your patient, Ayala Dawn, to the [facility] AND INFECTIOUS DISEASES at [facility]. Please see a copy of my visit note below.      INFECTIOUS DISEASE CLINIC    REASON FOR VISIT:   Initial consultation for sputum culture with growth of Mycobacterium species    REFERRED BY:  Dr. David Perlman of Pulmonology    HISTORY OF PRESENT ILLNESS:   Ms. Ayala Dawn is a shannon 86 y/o woman originally from [place] with HTN and a small cerebrovascular lesion on Plavix.  She was seen by Dr. Perlman in Pulmonology on 1/30/2018 and referred to us for consultation given a positive AFB sputum culture.  She is here today accompanied by her daughter     Respiratory history began just over 1 year ago when patient experienced some vague dyspnea symptoms and then had a sudden occurrence of significant hemoptysis on 2/28/2017.  Patient was on Plavix at the time, and continues on this today. She was admitted to her local hospital system for a work up.  CT of the Chest showed a patchy upper lobe infiltrate with some cystic lesions/bronchiectasis in the right upper lobe.  She was put in respiratory isolation for M. tuberculosis evaluation.  Per the patient, there were \"three negative sputums,\" and she was released from isolation.  Otherwise sputum cultures did not grow any organisms. A bronchoscopy was performed and some bleeding was noted from the right upper lobe, and all of those cultures were negative as well.  Patient received prednisone therapy beginning at 50mg and tapered off over 2 months.  The CT Chest was repeated, and continued to show the abnormalities.  Therefore, a surgical lung biopsy was done and this demonstrated necrotizing granulomatous inflammation with stains negative for any signs of fungal or mycobacterial organisms.   She was referred here " "to the [facility], and to Dr. Perlman for potential interstitial lung disease.  Per Dr. Perlman's note, he was most concerned about an infectious process.       Dr. Perlman ordered sputum cultures along with an extensive work up on blood.  Blood work up was largely unremarkable.  Potentially important findings there include a positive ANCA IgG (but with atypical staining pattern), and somewhat elevated \"Allergen Aspergillus fumigatus IgG\" and detected A. fumigatus #3 antibody, with a negative Aspergillus galactomannan antigen.  Quantiferon Gold (IGRA for M. tuberculosis) was positive. Patient could not produce sputum in clinic, and so she eventually produced a specimen that was dropped off at her local [facility] [place] and then sent to [facility] for work up.  Sputum specimen was collected 2/2/18.   AFB smear was reported as positive, many.  Mycobacterium tuberculosis PCR probe was not detected.  No organism had been identified at the time of Dr. Perlman's consultation request.    I called [facility] today 3/8/18, and was informed that there is an organism growing on the sputum AFB culture, with preliminary identification of Mycobacterium abscessus group.  No susceptibility testing has been set up as of yet.    Ms. Dawn tells me that overall she feels quite well.  She has some ongoing fatigue that she thinks has been present for the whole year and not really changed. She did not have any further hemoptysis since the initial presentation, that was until 2 weeks ago when she noticed a small amount of blood mixed with some sputum once.  She has a mild cough that was not always productive, but has become more so over time, with yellow or green sputum. She does not notice much shortness of breath, but it does occur occasionally.  When this does happen, she will sit down to rest and it will go away.  She does not feel this interferes much with her daily function.  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  She notes that her primary goals of care are to out-live her  such that she can remain his caregiver until his death. Otherwise, she feels quite satisfied with her life and does not have other unrealized goals or aspirations.  She denies any fevers, chills, or night sweats.  She does not have a great appetite in general, but does think it is down further in the last few weeks.  Her weight is stable and remains in the range of 125-130 lbs for many years.     Exposure history:  Born in [place] and grew up there during wartime. Father had pulmonary Mycobacterium tuberculosis when she was a child.  Patient has a history of a positive PPD. When she was evaluated as a younger person, she had a normal chest XR and no symptoms.  She does not recall ever being offered treatment for latent TB.  She recalls developing at least one episode of bronchitis every winter as a younger person.  She does not recall having any prolonged pulmonary illnesses.   Ms. Dawn met her  and moved with him to [place] as a young woman.  Otherwise, she does not have extensive travel history. 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  She also enjoyed furniture stripping and refinishing as a hobby.    PAST MEDICAL HISTORY:    Past Medical History:   Diagnosis Date     HTN (hypertension)    Small cerebrovascular lesion, on chronic Plavix    PAST SURGICAL HISTORY:  Past Surgical History:   Procedure Laterality Date     APPENDECTOMY       choleycystectomy       HYSTERECTOMY       FAMILY PAST MEDICAL HISTORY:  Family History   Problem Relation Age of Onset     Asthma Mother      SOCIAL HISTORY:  Social History     Social History     Marital status:      Spouse name: N/A     Number of children: N/A     Years of education: N/A     Occupational History     Not on file.     Social History Main Topics     Smoking status: Former Smoker     Quit date: 1/1/1973      Smokeless tobacco: Never Used     Alcohol use Yes      Comment: rare     Drug use: No     Sexual activity: Not on file     Other Topics Concern     Not on file     Social History Narrative     Smoked for 20 years before quitting.     As above, lives with her  and is his primary caretaker.  Adult children and grandchildren live nearby.    CURRENT MEDICATIONS:    Current Outpatient Prescriptions   Medication     albuterol (PROAIR HFA/PROVENTIL HFA/VENTOLIN HFA) 108 (90 BASE) MCG/ACT Inhaler     METOPROLOL SUCCINATE ER PO     RANITIDINE HCL PO     Cyanocobalamin (B-12) 1000 MCG CAPS     Ferrous Sulfate (IRON SUPPLEMENT PO)     ATORVASTATIN CALCIUM PO     desoximetasone (TOPICORT) 0.05 % GEL     CLOPIDOGREL BISULFATE PO     calcium carbonate (OS-VIDAL 500 MG [place]. CA) 1250 MG tablet     MAGNESIUM OXIDE PO     No current facility-administered medications for this visit.      ALLERGIES:    Allergies   Allergen Reactions     Phenobarbital Hives     REVIEW OF SYSTEMS: A full 12-point review of systems was obtained, pertinent positives and negatives as above.     PHYSICAL EXAMINATION:    VITAL SIGNS: reviewed  GENERAL:   No acute distress    HEENT: No icterus or injection. Oropharynx moist and clear without lesions or exudate.    NECK: Supple and nontender  LYMPH:  No cervical, axillary or inguinal lymphadenopathy  LUNGS: No increased work of breathing.  Crackles heard throughout the chest.  HEART: Regular rate and rhythm and no murmur, gallop or rub    ABDOMEN: Normoactive bowel sounds, soft, nontender, nondistended, no hepatosplenomegaly.    EXTREMITIES: Warm and well perfused without clubbing, cyanosis, or edema  SKIN:  No rashes  NEURO:  Awake, alert, no focal neurologic deficits.  PSYCH: Affect normal. Speech fluent and appropriate.     LABORATORY DATA:    Reviewed in EPIC.   Details in the HPI.    ASSESSMENT AND PLAN:   Ms. Ayala Dawn is an elderly woman at 85 years of age, and she has HTN and a small  cerebrovascular lesion for which she is maintained on chronic Plavix.  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 Dawn had a comprehensive work up last year after sudden hemoptysis and findings of a right upper lobe infiltrate.  AFB smears, sputums, cultures from bronchoscopy, and cultures from biopsy of the right upper lobe lesion were all negative for any organisms.  Stains from pathology on the biopsy also did not reveal any organisms.  Now, over a year later, she is found to have many AFB positive organisms on AFB smear, and AFB culture is growing Mycobacterium abscessus.  Given this history, we cannot be sure that the current infection is what caused the symptoms over a year ago.  We may also consider an underlying lung diagnosis that left Ms. Dawn more susceptible to opportunistic infection.  Mycobacterium abscessus lung infection is associated with chronic lung disease.     That being said, there is history now of cough with worsening sputum production, and occasional shortness of breath.  The microbiology findings now are not subtle, and so we do believe that she has a current lung infection with Mycobacterium abscessus.  This may warrant treatment.  However, we must keep in mind that the organism is notoriously difficult to treat, requires 12 months or more of treatment, and most often with at least one intravenous antibiotic needed.  This depends on the subspecies and antibiotic susceptibility pattern, and whether or not there is inducible resistance to macrolides such as clarithromycin.  Surgical resection of the involved lung is also commonly needed.    We ordered susceptibility testing on the organism from [facility] today.  I expect this will take upwards of 2 weeks to  return, as they will have to do specialized testing to look for the inducible resistance to macrolides.  In the meantime, the patient is stable and agrees to wait for this information before starting any therapy.     We also discussed her advanced age and goals of care.  This treatment course could be very lengthy, with potential for IV antibiotic infusion and surgery and may interfere with her quality of life and ability to care for her .  Currently she is minimally symptomatic.  Careful monitoring for advancing symptoms may be a more preferable way to manage this infection.     The patient and her daughter were given materials to read, and an opportunity to ask questions.   We will contact them when we have the susceptibility tests completed and can describe the most optimal regimen. We will also see if we can design a regimen that will treat latent tuberculosis at the same time.  Once we can present this regimen to the patient and her family, the patient will decide if she chooses to proceed with treatment or not.  We would also want repeat sputum and CT of the Chest before starting any targeted antimicrobial therapy.    Patient's daughter's name is Tiana Singh and her emails is [email or web address].    Patient was seen and discussed with the Infectious Disease attending and clinic preceptor, Dr. Joyce Earl.      Marcos Ruiz MD    Fellow in Adult and Pediatric Infectious Disease    pager: [phone]    Attending Attestation:  I evaluated Ms. Dawn with fellow Dr. Ruiz.  I have reviewed pertinent labs, vitals and imaging results. This note reflects our joint findings, assessment and recommendations. >50% of this 60 minute visit was spent coordinating care with [facility] (where cultures were obtained) and counseling the patient and her daughter about her infectious disease issues.     Joyce Earl MD  [phone]

## 2018-04-06 ENCOUNTER — TRANSFERRED RECORDS (OUTPATIENT)
Dept: HEALTH INFORMATION MANAGEMENT | Facility: CLINIC | Age: 83
End: 2018-04-06

## 2018-04-23 DIAGNOSIS — A31.8 MYCOBACTERIUM ABSCESSUS INFECTION: Primary | ICD-10-CM

## 2018-04-26 DIAGNOSIS — A31.8 MYCOBACTERIUM ABSCESSUS INFECTION: ICD-10-CM

## 2018-04-26 DIAGNOSIS — R05.9 COUGH: Primary | ICD-10-CM

## 2018-04-26 DIAGNOSIS — R06.02 SHORTNESS OF BREATH: ICD-10-CM

## 2018-05-03 ENCOUNTER — DOCUMENTATION ONLY (OUTPATIENT)
Dept: INFECTIOUS DISEASES | Facility: CLINIC | Age: 83
End: 2018-05-03

## 2018-05-03 ENCOUNTER — RADIANT APPOINTMENT (OUTPATIENT)
Dept: CT IMAGING | Facility: CLINIC | Age: 83
End: 2018-05-03
Payer: COMMERCIAL

## 2018-05-03 ENCOUNTER — ALLIED HEALTH/NURSE VISIT (OUTPATIENT)
Dept: INFECTIOUS DISEASES | Facility: CLINIC | Age: 83
End: 2018-05-03
Payer: MEDICARE

## 2018-05-03 DIAGNOSIS — A31.8 MYCOBACTERIUM ABSCESSUS INFECTION: ICD-10-CM

## 2018-05-03 DIAGNOSIS — R06.02 SHORTNESS OF BREATH: ICD-10-CM

## 2018-05-03 DIAGNOSIS — J47.9 BRONCHIECTASIS WITHOUT COMPLICATION (H): ICD-10-CM

## 2018-05-03 DIAGNOSIS — R05.9 COUGH: ICD-10-CM

## 2018-05-03 LAB
6 MIN WALK (FT): 1205 FT
6 MIN WALK (M): 367 M
ALBUMIN SERPL-MCNC: 3.1 G/DL (ref 3.4–5)
ALP SERPL-CCNC: 79 U/L (ref 40–150)
ALT SERPL W P-5'-P-CCNC: 11 U/L (ref 0–50)
ANION GAP SERPL CALCULATED.3IONS-SCNC: 5 MMOL/L (ref 3–14)
AST SERPL W P-5'-P-CCNC: 16 U/L (ref 0–45)
BASOPHILS # BLD AUTO: 0 10E9/L (ref 0–0.2)
BASOPHILS NFR BLD AUTO: 0.4 %
BILIRUB SERPL-MCNC: 0.4 MG/DL (ref 0.2–1.3)
BUN SERPL-MCNC: 15 MG/DL (ref 7–30)
CALCIUM SERPL-MCNC: 9.1 MG/DL (ref 8.5–10.1)
CHLORIDE SERPL-SCNC: 101 MMOL/L (ref 94–109)
CO2 SERPL-SCNC: 30 MMOL/L (ref 20–32)
CREAT BLD-MCNC: 0.8 MG/DL (ref 0.52–1.04)
CREAT SERPL-MCNC: 0.8 MG/DL (ref 0.52–1.04)
CRP SERPL-MCNC: 15.6 MG/L (ref 0–8)
DIFFERENTIAL METHOD BLD: ABNORMAL
EOSINOPHIL # BLD AUTO: 0.1 10E9/L (ref 0–0.7)
EOSINOPHIL NFR BLD AUTO: 1.3 %
ERYTHROCYTE [DISTWIDTH] IN BLOOD BY AUTOMATED COUNT: 15.1 % (ref 10–15)
ERYTHROCYTE [SEDIMENTATION RATE] IN BLOOD BY WESTERGREN METHOD: 80 MM/H (ref 0–30)
GFR SERPL CREATININE-BSD FRML MDRD: 68 ML/MIN/1.7M2
GFR SERPL CREATININE-BSD FRML MDRD: 68 ML/MIN/1.7M2
GLUCOSE SERPL-MCNC: 82 MG/DL (ref 70–99)
HCT VFR BLD AUTO: 35.1 % (ref 35–47)
HGB BLD-MCNC: 11.4 G/DL (ref 11.7–15.7)
IMM GRANULOCYTES # BLD: 0 10E9/L (ref 0–0.4)
IMM GRANULOCYTES NFR BLD: 0.3 %
LYMPHOCYTES # BLD AUTO: 1.7 10E9/L (ref 0.8–5.3)
LYMPHOCYTES NFR BLD AUTO: 21.9 %
MCH RBC QN AUTO: 28.4 PG (ref 26.5–33)
MCHC RBC AUTO-ENTMCNC: 32.5 G/DL (ref 31.5–36.5)
MCV RBC AUTO: 88 FL (ref 78–100)
MONOCYTES # BLD AUTO: 0.7 10E9/L (ref 0–1.3)
MONOCYTES NFR BLD AUTO: 8.7 %
NEUTROPHILS # BLD AUTO: 5.2 10E9/L (ref 1.6–8.3)
NEUTROPHILS NFR BLD AUTO: 67.4 %
NRBC # BLD AUTO: 0 10*3/UL
NRBC BLD AUTO-RTO: 0 /100
PLATELET # BLD AUTO: 267 10E9/L (ref 150–450)
POTASSIUM SERPL-SCNC: 4.2 MMOL/L (ref 3.4–5.3)
PROT SERPL-MCNC: 7.8 G/DL (ref 6.8–8.8)
RADIOLOGIST FLAGS: ABNORMAL
RBC # BLD AUTO: 4.01 10E12/L (ref 3.8–5.2)
SODIUM SERPL-SCNC: 136 MMOL/L (ref 133–144)
WBC # BLD AUTO: 7.7 10E9/L (ref 4–11)

## 2018-05-03 PROCEDURE — 86140 C-REACTIVE PROTEIN: CPT | Performed by: INTERNAL MEDICINE

## 2018-05-03 PROCEDURE — 85652 RBC SED RATE AUTOMATED: CPT | Performed by: INTERNAL MEDICINE

## 2018-05-03 PROCEDURE — 85025 COMPLETE CBC W/AUTO DIFF WBC: CPT | Performed by: INTERNAL MEDICINE

## 2018-05-03 PROCEDURE — 80053 COMPREHEN METABOLIC PANEL: CPT | Performed by: INTERNAL MEDICINE

## 2018-05-03 PROCEDURE — 36415 COLL VENOUS BLD VENIPUNCTURE: CPT | Performed by: INTERNAL MEDICINE

## 2018-05-03 RX ORDER — IOPAMIDOL 755 MG/ML
61 INJECTION, SOLUTION INTRAVASCULAR ONCE
Status: COMPLETED | OUTPATIENT
Start: 2018-05-03 | End: 2018-05-03

## 2018-05-03 RX ADMIN — IOPAMIDOL 61 ML: 755 INJECTION, SOLUTION INTRAVASCULAR at 12:00

## 2018-05-03 NOTE — DISCHARGE INSTRUCTIONS

## 2018-05-03 NOTE — PROGRESS NOTES
Pt needs to drop off sputum sample at her local clinic, [facility] in [place]. Faxed lab orders to them at fax # [phone]. Called Ayala and she knows to go to lab to drop off sputum sample.  Jenifer Rodrigez RN

## 2018-05-04 DIAGNOSIS — A31.8 MYCOBACTERIUM ABSCESSUS INFECTION: Primary | ICD-10-CM

## 2018-05-04 LAB
BACTERIA SPEC CULT: NORMAL
FUNGUS SPEC CULT: NORMAL
GRAM STN SPEC: NORMAL
SPECIMEN SOURCE: NORMAL

## 2018-05-05 LAB
ACID FAST STN SPEC QL: NORMAL
ACID FAST STN SPEC QL: NORMAL
MYCOBACTERIUM SPEC CULT: NORMAL
MYCOBACTERIUM SPEC CULT: NORMAL
SPECIMEN SOURCE: NORMAL
SPECIMEN SOURCE: NORMAL

## 2018-05-07 DIAGNOSIS — A31.8 MYCOBACTERIUM ABSCESSUS INFECTION: ICD-10-CM

## 2018-05-07 LAB
BACTERIA SPEC CULT: ABNORMAL
BACTERIA SPEC CULT: ABNORMAL
FUNGUS SPEC CULT: ABNORMAL
FUNGUS SPEC CULT: ABNORMAL
GRAM STN SPEC: ABNORMAL
Lab: ABNORMAL
SPECIMEN SOURCE: ABNORMAL

## 2018-05-07 PROCEDURE — 87158 CULTURE TYPING ADDED METHOD: CPT | Mod: 90 | Performed by: INTERNAL MEDICINE

## 2018-05-07 PROCEDURE — 87015 SPECIMEN INFECT AGNT CONCNTJ: CPT | Mod: 90 | Performed by: INTERNAL MEDICINE

## 2018-05-07 PROCEDURE — 87186 SC STD MICRODIL/AGAR DIL: CPT | Mod: 90 | Performed by: INTERNAL MEDICINE

## 2018-05-07 PROCEDURE — 87206 SMEAR FLUORESCENT/ACID STAI: CPT | Mod: 90 | Performed by: INTERNAL MEDICINE

## 2018-05-07 PROCEDURE — 87186 SC STD MICRODIL/AGAR DIL: CPT | Mod: 59 | Performed by: INTERNAL MEDICINE

## 2018-05-07 PROCEDURE — 87116 MYCOBACTERIA CULTURE: CPT | Mod: 90 | Performed by: INTERNAL MEDICINE

## 2018-05-07 PROCEDURE — 87149 DNA/RNA DIRECT PROBE: CPT | Mod: 59 | Performed by: INTERNAL MEDICINE

## 2018-05-07 PROCEDURE — 87205 SMEAR GRAM STAIN: CPT | Performed by: INTERNAL MEDICINE

## 2018-05-07 PROCEDURE — 99000 SPECIMEN HANDLING OFFICE-LAB: CPT | Performed by: INTERNAL MEDICINE

## 2018-05-10 LAB
ACID FAST STN SPEC QL: NORMAL
DLCOCOR-%PRED-PRE: 62 %
DLCOCOR-PRE: 11.18 ML/MIN/MMHG
DLCOUNC-%PRED-PRE: 57 %
DLCOUNC-PRE: 10.42 ML/MIN/MMHG
DLCOUNC-PRED: 18 ML/MIN/MMHG
ERV-%PRED-PRE: 48 %
ERV-PRE: 0.28 L
ERV-PRED: 0.58 L
EXPTIME-PRE: 7.52 SEC
FEF2575-%PRED-PRE: 112 %
FEF2575-PRE: 1.52 L/SEC
FEF2575-PRED: 1.35 L/SEC
FEFMAX-%PRED-PRE: 106 %
FEFMAX-PRE: 4.2 L/SEC
FEFMAX-PRED: 3.95 L/SEC
FEV1-%PRED-PRE: 67 %
FEV1-PRE: 1.14 L
FEV1FEV6-PRE: 87 %
FEV1FEV6-PRED: 77 %
FEV1FVC-PRE: 85 %
FEV1FVC-PRED: 73 %
FEV1SVC-PRE: 90 %
FEV1SVC-PRED: 68 %
FIFMAX-PRE: 1.98 L/SEC
FRCPLETH-%PRED-PRE: 86 %
FRCPLETH-PRE: 2.25 L
FRCPLETH-PRED: 2.61 L
FVC-%PRED-PRE: 60 %
FVC-PRE: 1.35 L
FVC-PRED: 2.23 L
IC-%PRED-PRE: 52 %
IC-PRE: 0.99 L
IC-PRED: 1.9 L
RVPLETH-%PRED-PRE: 88 %
RVPLETH-PRE: 1.97 L
RVPLETH-PRED: 2.22 L
SPECIMEN SOURCE: NORMAL
TLCPLETH-%PRED-PRE: 70 %
TLCPLETH-PRE: 3.24 L
TLCPLETH-PRED: 4.6 L
VA-%PRED-PRE: 51 %
VA-PRE: 2.45 L
VC-%PRED-PRE: 51 %
VC-PRE: 1.27 L
VC-PRED: 2.48 L

## 2018-05-25 ENCOUNTER — OFFICE VISIT (OUTPATIENT)
Dept: PULMONOLOGY | Facility: CLINIC | Age: 83
End: 2018-05-25
Attending: INTERNAL MEDICINE
Payer: MEDICARE

## 2018-05-25 ENCOUNTER — OFFICE VISIT (OUTPATIENT)
Dept: INFECTIOUS DISEASES | Facility: CLINIC | Age: 83
End: 2018-05-25
Attending: INTERNAL MEDICINE
Payer: MEDICARE

## 2018-05-25 VITALS — SYSTOLIC BLOOD PRESSURE: 175 MMHG | OXYGEN SATURATION: 97 % | HEART RATE: 76 BPM | DIASTOLIC BLOOD PRESSURE: 82 MMHG

## 2018-05-25 VITALS
RESPIRATION RATE: 16 BRPM | DIASTOLIC BLOOD PRESSURE: 82 MMHG | SYSTOLIC BLOOD PRESSURE: 175 MMHG | OXYGEN SATURATION: 97 % | HEART RATE: 76 BPM

## 2018-05-25 DIAGNOSIS — A31.8 MYCOBACTERIUM ABSCESSUS INFECTION: Primary | ICD-10-CM

## 2018-05-25 DIAGNOSIS — J47.9 BRONCHIECTASIS WITHOUT COMPLICATION (H): Primary | ICD-10-CM

## 2018-05-25 DIAGNOSIS — A31.9 MYCOBACTERIAL INFECTION, ATYPICAL: ICD-10-CM

## 2018-05-25 DIAGNOSIS — R04.2 HEMOPTYSIS: ICD-10-CM

## 2018-05-25 DIAGNOSIS — Z22.7 LATENT TUBERCULOSIS INFECTION: ICD-10-CM

## 2018-05-25 PROCEDURE — G0463 HOSPITAL OUTPT CLINIC VISIT: HCPCS | Mod: ZF

## 2018-05-25 ASSESSMENT — PAIN SCALES - GENERAL: PAINLEVEL: NO PAIN (0)

## 2018-05-25 NOTE — MR AVS SNAPSHOT
After Visit Summary   5/25/2018    Ayala Dawn    MRN: [id number]           Patient Information     Date Of Birth          12/21/1932        Visit Information        Provider Department      5/25/2018 12:00 PM Perlman, David Morris, MD [facility]        Today's Diagnoses     Bronchiectasis without complication (H)    -  1    Mycobacterial infection, atypical           Follow-ups after your visit        Follow-up notes from your care team     Return in about 4 months (around 9/25/2018).      Your next 10 appointments already scheduled     Sep 25, 2018  1:30 PM CDT   PFT VISIT with  PFL B   [facility] Pulmonary Function Testing ([facility])    [address]  3rd Floor  [facility] [address] [phone]            Sep 25, 2018  2:00 PM CDT   (Arrive by 1:45 PM)   Return Interstitial Lung with David Morris Perlman, MD   [facility] ([facility])    [address]  [address] [place] [address] [phone]              Who to contact     If you have questions or need follow up information about today's clinic visit or your schedule please contact [facility] directly at [phone].  Normal or non-critical lab and imaging results will be communicated to you by MyChart, letter or phone within 4 business days after the clinic has received the results. If you do not hear from us within 7 days, please contact the clinic through MyChart or phone. If you have a critical or abnormal lab result, we will notify you by phone as soon as possible.  Submit refill requests through [employer] or call your pharmacy and they will forward the refill request to us. Please allow 3 business days for your refill to be completed.          Additional Information About Your Visit        Care EveryWhere ID     This is your Care EveryWhere ID. This could be  used by other organizations to access your [place] medical records  [id number]        Your Vitals Were     Pulse Respirations Pulse Oximetry             76 16 97%          Blood Pressure from Last 3 Encounters:   05/25/18 175/82   05/25/18 175/82   03/08/18 196/75    Weight from Last 3 Encounters:   03/08/18 56.5 kg (124 lb 9.6 oz)   01/30/18 57.2 kg (126 lb)               Primary Care Provider Office Phone # Fax #    Yasmine Oakley [phone]       [facility] [address]  [place] [address]        Equal Access to Services     [facility] Center[facility] : Hadii shemar ku hadasho Soomaali, waaxda luqadaha, qaybta kaalmada adeegyada, alex chan. So [facility] [phone].    ATENCIÓN: Si habla español, tiene a kwong disposición servicios gratuitos de asistencia lingüística. [facility] [phone].    We comply with applicable federal civil rights laws and [place] laws. We do not discriminate on the basis of race, color, national origin, age, disability, sex, sexual orientation, or gender identity.            Thank you!     Thank you for choosing [facility]  for your care. Our goal is always to provide you with excellent care. Hearing back from our patients is one way we can continue to improve our services. Please take a few minutes to complete the written survey that you may receive in the mail after your visit with us. Thank you!             Your Updated Medication List - Protect others around you: Learn how to safely use, store and throw away your medicines at www.disposemymeds.org.          This list is accurate as of 5/25/18 11:59 PM.  Always use your most recent med list.                   Brand Name Dispense Instructions for use Diagnosis    albuterol 108 (90 Base) MCG/ACT Inhaler    PROAIR HFA/PROVENTIL HFA/VENTOLIN HFA     Inhale 2 puffs into the lungs every 6 hours        ATORVASTATIN CALCIUM PO      Take 20 mg by mouth         B-12 1000 MCG Caps      Take 1 tablet by mouth daily        calcium carbonate 500 MG tablet    OS-VIDAL 500 mg [place]. Ca     Take 1 tablet by mouth 3 times daily        CLOPIDOGREL BISULFATE PO      Take 75 mg by mouth        desoximetasone 0.05 % Gel    TOPICORT     Apply topically as needed (Rosea)        IRON SUPPLEMENT PO      Take 325 mg by mouth        MAGNESIUM OXIDE PO      Take 400 mg by mouth daily        METOPROLOL SUCCINATE ER PO      Take 100 mg by mouth daily        RANITIDINE HCL PO      Take 150 mg by mouth 2 times daily

## 2018-05-25 NOTE — PROGRESS NOTES
"  INFECTIOUS DISEASE CLINIC    REASON FOR VISIT:  follow-up Mycobacterium abscessus pulmonary infection    HISTORY OF PRESENT ILLNESS:   Ms. Ayala Dawn is a shannon 86 y/o woman originally from [place] with HTN and a small cerebrovascular lesion on Plavix.  She was seen by Dr. Perlman in Pulmonology on 1/30/2018 and referred to us for consultation 3/8/2018 given a positive AFB sputum culture.  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 (See below for detailed history). She returns today accompanied by her daughter.  Patient is seen together with Dr. Perlman of pulmonology today.    After our initial visit, we learned that the Mycobacterium abscessus is highly resistant, and displays inducible resistance to macrolides including clarithromycin.  The organism susceptible to only amikacin (S), tigecycline (S), and linezolid (S) on sputum AFB culture from 2/2 but (I) on sputum AFB culture from 5/7.     Initially, it was our impression that Ms. Dawn was only mildly symptomatic with occasional shortness of breath that did not interfere with function.  However, Ms. Dawn did report over the telephone in the last few months that she does have worsening shortness of breath and fatigue, and intermitted productive cough.  Some days are worse than others.  Over the telephone she had said \"If we do not treat - is there something we can do to help make it easier to deal with?\"   Today, she reports that symptoms are manageable, so it seems that it depends on the day.  Sputum production is down from last month. Due to these worsening symptoms, we decided to repeat the CT of the Chest, pulmonary function tests, and sputum collection.   Both CT and PFTs are marginally worse.  She was not able to produce a sputum specimen in clinic, but did provide a second sputum sample on 5/7/18 that she collected at home.  The sample was reflective of oral contamination with " ">10 squamous cells/high power field and also <5mL, but it did still grow on the AFB culture.    Ms. Dawn did have a recent episode of significant hemoptysis.  She decided to go to the ED at her local hospital at [facility].   However, the hemoptysis resolved and she found herself waiting a long time, and having to repeat her complicated history.  She grew frustrated and left AMA.   This was the first time she had significant hemoptysis since the initial episode in February 2017.   She had seen some streaks of blood occasionally in the interim, but this was jocelyn hemoptysis.    Pertinent history:    Respiratory history began just over 1 year ago when patient experienced some vague dyspnea symptoms and then had a sudden occurrence of significant hemoptysis on 2/28/2017. Patient was on Plavix at the time, and continues on this today. She was admitted to her local hospital system [facility] for a work up.  CT of the Chest showed a patchy upper lobe infiltrate with some cystic lesions/bronchiectasis in the right upper lobe.  She was put in respiratory isolation for M. tuberculosis evaluation.  Per the patient, there were \"three negative sputums,\" and she was released from isolation.  Otherwise sputum cultures did not grow any organisms. A bronchoscopy was performed and some bleeding was noted from the right upper lobe, and all of those cultures were negative as well.  Patient received prednisone therapy beginning at 50mg and tapered off over 2 months.  The CT Chest was repeated, and continued to show the abnormalities.  Therefore, a surgical lung biopsy was done and this demonstrated necrotizing granulomatous inflammation with stains negative for any signs of fungal or mycobacterial organisms.   She was referred here to the [facility], and to Dr. Perlman in 1/2018 for potential interstitial lung disease.  Per Dr. Perlman's note, he was most concerned about an infectious process.        Dr. Perlman " "ordered sputum cultures along with an extensive work up on blood.  Blood work up was largely unremarkable.  Potentially important findings there include a positive ANCA IgG (but with atypical staining pattern), and somewhat elevated \"Allergen Aspergillus fumigatus IgG\" and detected A. fumigatus #3 antibody, with a negative Aspergillus galactomannan antigen.  Quantiferon Gold (IGRA for M. tuberculosis) was positive. Patient could not produce sputum in clinic, and so she eventually produced a specimen that was dropped off at her local [facility] and then sent to [facility] for work up.  Sputum specimen was collected 2/2/18.  AFB smear was reported as positive, many. Mycobacterium tuberculosis PCR probe was not detected. The organism was eventually identified as Mycobacterium abscessus.     Exposure history:  Born in [place] and grew up there during wartime. Father had pulmonary Mycobacterium tuberculosis when she was a child.  Patient has a history of a positive PPD. When she was evaluated as a younger person, she had a normal chest XR and no symptoms.  She does not recall ever being offered treatment for latent TB.  She recalls developing at least one episode of bronchitis every winter as a younger person.  She does not recall having any prolonged pulmonary illnesses.   Ms. Dawn met her  and moved with him to [place] as a young woman.  Otherwise, she does not have extensive travel history. 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  She also enjoyed furniture stripping and refinishing as a hobby.     PAST MEDICAL HISTORY:  Past Medical History:   Diagnosis Date     HTN (hypertension)      Small cerebrovascular lesion, on chronic Plavix     PAST SURGICAL HISTORY:  Past Surgical History:   Procedure Laterality Date     APPENDECTOMY       choleycystectomy       HYSTERECTOMY       FAMILY PAST MEDICAL HISTORY:   Family History   Problem Relation Age of Onset     Asthma Mother  "     SOCIAL HISTORY:  Social History     Social History     Marital status:      Spouse name: N/A     Number of children: N/A     Years of education: N/A     Occupational History     Not on file.     Social History Main Topics     Smoking status: Former Smoker     Quit date: 1/1/1973     Smokeless tobacco: Never Used     Alcohol use Yes      Comment: rare     Drug use: No     Sexual activity: Not on file     Other Topics Concern     Not on file     Social History Narrative     Smoked for 20 years before quitting.      As above, lives with her  and is his primary caretaker.  Adult children and grandchildren live nearby.     CURRENT MEDICATIONS:        Current Outpatient Prescriptions   Medication     albuterol (PROAIR HFA/PROVENTIL HFA/VENTOLIN HFA) 108 (90 BASE) MCG/ACT Inhaler     METOPROLOL SUCCINATE ER PO     RANITIDINE HCL PO     Cyanocobalamin (B-12) 1000 MCG CAPS     Ferrous Sulfate (IRON SUPPLEMENT PO)     ATORVASTATIN CALCIUM PO     desoximetasone (TOPICORT) 0.05 % GEL     CLOPIDOGREL BISULFATE PO     calcium carbonate (OS-VIDAL 500 MG [place]. [place]) 1250 MG tablet     MAGNESIUM OXIDE PO      No current facility-administered medications for this visit.       ALLERGIES:         Allergies   Allergen Reactions     Phenobarbital Hives      REVIEW OF SYSTEMS: A full 12-point review of systems was obtained, pertinent positives and negatives as above.      PHYSICAL EXAMINATION:    VITAL SIGNS: reviewed  GENERAL:   No acute distress    HEENT: No icterus or injection. Oropharynx moist and clear without lesions or exudate.    NECK: Supple and nontender  LYMPH:  No cervical, axillary or inguinal lymphadenopathy  LUNGS: No increased work of breathing.  Crackles heard throughout the chest.  HEART: Regular rate and rhythm and no murmur, gallop or rub    ABDOMEN: Normoactive bowel sounds, soft, nontender, nondistended, no hepatosplenomegaly.    EXTREMITIES: Warm and well perfused without clubbing, cyanosis, or  edema  SKIN:  No rashes  NEURO:  Awake, alert, no focal neurologic deficits.  PSYCH: Affect normal. Speech fluent and appropriate.      LABORATORY DATA:    Reviewed in EPIC.   Details in the HPI.    ASSESSMENT and PLAN:    (A31.9) Mycobacterium abscessus infection  (primary encounter diagnosis)    (R76.11) Latent tuberculosis infection    It is important to note that Ms. Dawn had a comprehensive work up last year after sudden hemoptysis and findings of a right upper lobe infiltrate. AFB smears, sputums, cultures from bronchoscopy, and cultures from biopsy of the right upper lobe lesion were all negative for any organisms.  Stains from pathology on the biopsy also did not reveal any organisms. Now, over a year later, AFB cultures are growing Mycobacterium abscessus.  Given this history, we cannot be sure that the current infection is what caused the symptoms over a year ago.  We may also consider an underlying lung diagnosis that left Ms. Dawn more susceptible to opportunistic infection, or existing bronchiectasis related to prior M. tuberculosis exposure and/or her history of frequent respiratory infections as a child.  Mycobacterium abscessus lung infection is associated with chronic lung disease.     At this point, Ms. Dawn is symptomatic of Mycobacterium abscessus lung infection, and has two positive sputums  by about 4 months.  It is reasonable to consider antimicrobial therapy.  However, Ms. Dawn is of advanced age, and the course of therapy would be quite extensive and with significant potential toxicities.    We had a long discussion today about Ms. Dawn's symptoms and their effect on her quality of life and function.  She reiterated that her main goal is to be able to care for her , who is 89 y/o and has significant CHF.  She would like to live as long as he does and have the functional capacity to care for him.  At this time, she feels she is functional and able to provide the level of  care that he needs.    We described the details of the available antimicrobial therapy.  It would require the insertion of a PICC, with once daily IV infusion each of amikacin and tigecycline, and twice daily dosing of linezolid. Labs would be needed at least weekly with nursing visits in the home. The course would be for at least 12 months, maybe longer.  There is a low likelihood of cure.  Amikacin carries many risks, including nephrotoxicity and ototoxicity.  Due to her existing auditory deficits, she would need a baseline auditory exam.  Tigecycline has been associated with increased mortality (non-specific) and is reserved for when there are no other options. Many people report GI symptoms on tigecycline including nausea, vomiting, and anorexia. Linezolid is associated with bone marrow suppression, and most often causes thrombocytopenia after 2 weeks of therapy.     After our conversation today, Ms. Dawn and her daughter chose to not seek antimicrobial therapy at this time.   Instead, we will plan for close monitoring.  Ms. Dawn can change her mind at any time, and we can consider therapy once again.   Patient has PFTs and a visit with Pulmonology scheduled in 4 months, on 9/25/18.  We can check in with her at that time as well.  In the interim, she has our contact information and will reach out if symptoms are worsening and/or she is interested in pursuing antimicrobial therapy.  We discussed the option to talk about her options and goals of care with Palliative Care as well.   She declined this option today, but knows that this will remain available to her in the future.    Patient's daughter's name is Tiana Singh and her emails is [email or web address].    Patient was staffed with the Infectious Disease attending and clinic preceptor, Dr. Dennis Roman.      More than half of this 60 minute visit was spent in counseling the patient and her daughter. We spent greater than 40 minutes  face-to-face discussing prognosis and the treatment options, including the associated risks and toxicities of the proposed antimicrobial therapy.     Marcos Ruiz MD    Fellow in Adult and Pediatric Infectious Disease    pager: [phone]

## 2018-05-25 NOTE — LETTER
5/25/2018      RE: Ayala Dawn  [address]  [address]  [place] [address]         INFECTIOUS DISEASE CLINIC    REASON FOR VISIT:  follow-up Mycobacterium abscessus pulmonary infection    HISTORY OF PRESENT ILLNESS:   Ms. Ayala Dawn is a shannon 84 y/o woman originally from [place] with HTN and a small cerebrovascular lesion on Plavix.  She was seen by Dr. Perlman in Pulmonology on 1/30/2018 and referred to us for consultation 3/8/2018 given a positive AFB sputum culture.  She returns today accompanied by her daughter.          Patient's daughter's name is Tiana Singh and her emails is [email or web address].        Patient was staffed with the Infectious Disease attending and clinic preceptor, Dr. Dennis Roman.      More than half of this 60 minute visit was spent in counseling the patient and her daughter. We spent greater than 40 minutes face-to-face discussing prognosis and the treatment options, including the associated risks and toxicities of the proposed antimicrobial therapy.     Marcos Ruiz MD    Fellow in Adult and Pediatric Infectious Disease    pager: [phone]    Marcos Ruiz MD

## 2018-05-25 NOTE — LETTER
5/25/2018       RE: Ayala Dawn  [address]  [place] [address]     Dear Colleague,    Thank you for referring your patient, Ayala Dawn, to the [facility] at [facility]. Please see a copy of my visit note below.    Met with patient in a joint visit with infectious disease to discuss potential treatment options.  The patient has grown Mycobacterium abscesses from culture and does have specific lesions consistent with mycobacterial infection on her CT scan.  However the concern is that the treatment of the abscess is quite toxic and the natural history of this is unclear the patient has not had any further episodes of hemoptysis and otherwise generally is asymptomatic she has good energy level and generally does not have any significant shortness of breath she does have a mild cough but otherwise generally feels relatively well.  We discussed the potential side effects of therapy and the potential risks of not treating versus treating.  The patient is going to discuss this with her  and think about it a little bit and will get back to us and likely proceed with strategy of close monitoring.  We did discuss the importance of seeking medical attention if she does develop significant hemoptysis particular at the [facility] so that she can be considered for embolization therapy if that is indicated.  Otherwise we will continue to follow the patient per previous schedule.       30 minutes was spent with the patient discussing the above issues.    Again, thank you for allowing me to participate in the care of your patient.      Sincerely,    David Morris Perlman, MD

## 2018-05-25 NOTE — NURSING NOTE
Chief Complaint   Patient presents with     Interstitial Lung Disease (ILD)     Patient is being seen for ILD follow up      Tiana Rai CMA at 11:48 AM on 5/25/2018

## 2018-05-25 NOTE — MR AVS SNAPSHOT
After Visit Summary   5/25/2018    Ayala Dawn    MRN: [id number]           Patient Information     Date Of Birth          12/21/1932        Visit Information        Provider Department      5/25/2018 12:30 PM Marcos Ruiz MD [facility] and Infectious Diseases        Today's Diagnoses     Mycobacterium abscessus infection    -  1       Follow-ups after your visit        Your next 10 appointments already scheduled     Sep 25, 2018  1:30 PM CDT   PFT VISIT with  PFL ALESHIA   [facility] Pulmonary Function Testing ([facility])    [address]  3rd [address]  [place] [id number] [phone]            Sep 25, 2018  2:00 PM CDT   (Arrive by 1:45 PM)   Return Interstitial Lung with David Morris Perlman, MD   [facility] ([facility])    [address]  [address]  [place] [address] [phone]              Who to contact     If you have questions or need follow up information about today's clinic visit or your schedule please contact [facility] AND INFECTIOUS DISEASES directly at [phone].  Normal or non-critical lab and imaging results will be communicated to you by MyChart, letter or phone within 4 business days after the clinic has received the results. If you do not hear from us within 7 days, please contact the clinic through MyChart or phone. If you have a critical or abnormal lab result, we will notify you by phone as soon as possible.  Submit refill requests through [employer] or call your pharmacy and they will forward the refill request to us. Please allow 3 business days for your refill to be completed.          Additional Information About Your Visit        Care EveryWhere ID     This is your Care EveryWhere ID. This could be used by other organizations to access your [facility] medical records  [id number]        Your Vitals Were     Pulse Pulse Oximetry                 76 97%           Blood Pressure from Last 3 Encounters:   No data found for BP    Weight from Last 3 Encounters:   No data found for Wt              Today, you had the following     No orders found for display       Primary Care Provider Office Phone # Fax #    Yasmine Oakley [phone]       [facility] [address]  [place] [address]        Equal Access to Services     [facility]: Hadii aad ku hadasho Soomaali, waaxda luqadaha, qaybta kaalmada adeegyada, waxay idiin hayaan adeeg kharash la'aan ah. So [facility] [phone].    ATENCIÓN: Si habla español, tiene a kwong disposición servicios gratuitos de asistencia lingüística. Ll[facility]tering [facility] [phone].    We comply with applicable federal civil rights laws and [place] laws. We do not discriminate on the basis of race, color, national origin, age, disability, sex, sexual orientation, or gender identity.            Thank you!     Thank you for choosing [facility] AND INFECTIOUS DISEASES  for your care. Our goal is always to provide you with excellent care. Hearing back from our patients is one way we can continue to improve our services. Please take a few minutes to complete the written survey that you may receive in the mail after your visit with us. Thank you!             Your Updated Medication List - Protect others around you: Learn how to safely use, store and throw away your medicines at www.disposemymeds.org.          This list is accurate as of 5/25/18 11:59 PM.  Always use your most recent med list.                   Brand Name Dispense Instructions for use Diagnosis    albuterol 108 (90 Base) MCG/ACT Inhaler    PROAIR HFA/PROVENTIL HFA/VENTOLIN HFA     Inhale 2 puffs into the lungs every 6 hours        ATORVASTATIN CALCIUM PO      Take 20 mg by mouth        B-12 1000 MCG Caps      Take 1 tablet by mouth daily        calcium carbonate 500 MG tablet    OS-VIDAL 500 mg [place]. Ca     Take 1 tablet by mouth  3 times daily        CLOPIDOGREL BISULFATE PO      Take 75 mg by mouth        desoximetasone 0.05 % Gel    TOPICORT     Apply topically as needed (Rose[place])        IRON SUPPLEMENT PO      Take 325 mg by mouth        MAGNESIUM OXIDE PO      Take 400 mg by mouth daily        METOPROLOL SUCCINATE ER PO      Take 100 mg by mouth daily        RANITIDINE HCL PO      Take 150 mg by mouth 2 times daily

## 2018-05-25 NOTE — NURSING NOTE
Chief Complaint   Patient presents with     RECHECK     follow up with mycobacterium, salenaimmer cma     /82  Pulse 76  SpO2 97%

## 2018-05-30 LAB
MYCOBACTERIUM SPEC CULT: ABNORMAL
SPECIMEN SOURCE: ABNORMAL

## 2018-09-25 ENCOUNTER — OFFICE VISIT (OUTPATIENT)
Dept: PULMONOLOGY | Facility: CLINIC | Age: 83
End: 2018-09-25
Attending: INTERNAL MEDICINE
Payer: MEDICARE

## 2018-09-25 VITALS
WEIGHT: 115.4 LBS | HEIGHT: 62 IN | OXYGEN SATURATION: 95 % | BODY MASS INDEX: 21.23 KG/M2 | SYSTOLIC BLOOD PRESSURE: 163 MMHG | RESPIRATION RATE: 16 BRPM | HEART RATE: 71 BPM | DIASTOLIC BLOOD PRESSURE: 68 MMHG

## 2018-09-25 DIAGNOSIS — J47.9 BRONCHIECTASIS WITHOUT COMPLICATION (H): ICD-10-CM

## 2018-09-25 DIAGNOSIS — J47.9 BRONCHIECTASIS WITHOUT COMPLICATION (H): Primary | ICD-10-CM

## 2018-09-25 PROCEDURE — G0463 HOSPITAL OUTPT CLINIC VISIT: HCPCS | Mod: ZF

## 2018-09-25 RX ORDER — ALBUTEROL SULFATE 0.83 MG/ML
2.5 SOLUTION RESPIRATORY (INHALATION) 4 TIMES DAILY
Qty: 360 ML | Refills: 3 | Status: SHIPPED | OUTPATIENT
Start: 2018-09-25

## 2018-09-25 ASSESSMENT — PAIN SCALES - GENERAL: PAINLEVEL: NO PAIN (0)

## 2018-09-25 NOTE — MR AVS SNAPSHOT
After Visit Summary   9/25/2018    Ayala Dawn    MRN: [id number]           Patient Information     Date Of Birth          12/21/1932        Visit Information        Provider Department      9/25/2018 2:00 PM Perlman, David Morris, MD [facility]        Today's Diagnoses     Bronchiectasis without complication (H)    -  1       Follow-ups after your visit        Follow-up notes from your care team     Return in about 3 months (around 12/25/2018).      Your next 10 appointments already scheduled     Jan 25, 2019 11:00 AM CST   PFT VISIT with  PFL [facility] Pulmonary Function Testing ([facility])    [address]  3rd Floor  [facility] [id number] [phone]            Jan 25, 2019 11:30 AM CST   (Arrive by 11:15 AM)   Return Interstitial Lung with David Morris Perlman, MD   [facility] ([facility])    [address]  [address]  [facility] [id number] [phone]              Future tests that were ordered for you today     Open Future Orders        Priority Expected Expires Ordered    General PFT Lab (Please always keep checked) Routine  9/25/2019 9/25/2018    Pulmonary Function Test Routine  9/25/2019 9/25/2018            Who to contact     If you have questions or need follow up information about today's clinic visit or your schedule please contact [facility] directly at [phone].  Normal or non-critical lab and imaging results will be communicated to you by MyChart, letter or phone within 4 business days after the clinic has received the results. If you do not hear from us within 7 days, please contact the clinic through MyChart or phone. If you have a critical or abnormal lab result, we will notify you by phone as soon as possible.  Submit refill requests through [employer] or call your pharmacy and they will forward the  "refill request to us. Please allow 3 business days for your refill to be completed.          Additional Information About Your Visit        [employer] Information     [email or web address] lets you send messages to your doctor, view your test results, renew your prescriptions, schedule appointments and more. To sign up, go to [email or web address] . Click on \"Log in\" on the left side of the screen, which will take you to the Welcome page. Then click on \"Sign up Now\" on the right side of the page.     You will be asked to enter the access code listed below, as well as some personal information. Please follow the directions to create your username and password.     Your access code is: [email or web address]  Expires: 12/10/2018  6:30 AM     Your access code will  in 90 days. If you need help or a new code, please call your [place] clinic or [phone].        Care EveryWhere ID     This is your Care EveryWhere ID. This could be used by other organizations to access your [place] medical records  [id number]        Your Vitals Were     Pulse Respirations Height Pulse Oximetry BMI (Body Mass Index)       71 16 1.575 m (5' 2.01\") 95% 21.1 kg/m2        Blood Pressure from Last 3 Encounters:   18 163/68   18 175/82   18 175/82    Weight from Last 3 Encounters:   18 52.3 kg (115 lb 6.4 oz)   18 56.5 kg (124 lb 9.6 oz)   18 57.2 kg (126 lb)                 Today's Medication Changes          These changes are accurate as of 18  2:34 PM.  If you have any questions, ask your nurse or doctor.               Start taking these medicines.        Dose/Directions    order for DME   Used for:  Bronchiectasis without complication (H)   Started by:  Perlman, David Morris, MD        Equipment being ordered: Nebulizer   Quantity:  1 Device   Refills:  0         These medicines have changed or have updated prescriptions.        Dose/Directions    * albuterol 108 (90 Base) MCG/ACT inhaler   Commonly known as:  " PROAIR HFA/PROVENTIL HFA/VENTOLIN HFA   This may have changed:  Another medication with the same name was added. Make sure you understand how and when to take each.   Changed by:  Perlman, David Morris, MD        Dose:  2 puff   Inhale 2 puffs into the lungs every 6 hours   Refills:  0       * albuterol (2.5 MG/3ML) 0.083% neb solution   This may have changed:  You were already taking a medication with the same name, and this prescription was added. Make sure you understand how and when to take each.   Used for:  Bronchiectasis without complication (H)   Changed by:  Perlman, David Morris, MD        Dose:  2.5 mg   Take 1 vial (2.5 mg) by nebulization 4 times daily   Quantity:  360 mL   Refills:  3       * Notice:  This list has 2 medication(s) that are the same as other medications prescribed for you. Read the directions carefully, and ask your doctor or other care provider to review them with you.         Where to get your medicines      These medications were sent to [facility] - [address]  [facility] [address], [facility] [address]     Phone:  [phone]     albuterol (2.5 MG/3ML) 0.083% neb solution         Some of these will need a paper prescription and others can be bought over the counter.  Ask your nurse if you have questions.     Bring a paper prescription for each of these medications     order for DME                Primary Care Provider Office Phone # Fax #    Yasmine Oakley [phone]       [facility] [address] [place] [id number]        Equal Access to Services     [facility]: Hadii aad ku hadasho Soomaali, waaxda luqadaha, qaybta kaalmada adeegyasebastian, alex levy . So [facility] [phone].    ATENCIÓN: Si habla español, tiene a kwong disposición servicios gratuitos de asistencia lingüística. Llame al [phone].    We comply with applicable federal civil rights laws and  [place] laws. We do not discriminate on the basis of race, color, national origin, age, disability, sex, sexual orientation, or gender identity.            Thank you!     Thank you for choosing [facility]  for your care. Our goal is always to provide you with excellent care. Hearing back from our patients is one way we can continue to improve our services. Please take a few minutes to complete the written survey that you may receive in the mail after your visit with us. Thank you!             Your Updated Medication List - Protect others around you: Learn how to safely use, store and throw away your medicines at www.disposemymeds.org.          This list is accurate as of 9/25/18  2:34 PM.  Always use your most recent med list.                   Brand Name Dispense Instructions for use Diagnosis    * albuterol 108 (90 Base) MCG/ACT inhaler    PROAIR HFA/PROVENTIL HFA/VENTOLIN HFA     Inhale 2 puffs into the lungs every 6 hours        * albuterol (2.5 MG/3ML) 0.083% neb solution     360 mL    Take 1 vial (2.5 mg) by nebulization 4 times daily    Bronchiectasis without complication (H)       ATORVASTATIN CALCIUM PO      Take 20 mg by mouth        B-12 1000 MCG Caps      Take 1 tablet by mouth daily        calcium carbonate 500 mg {elemental} 500 MG tablet    OS-VIDAL     Take 1 tablet by mouth 3 times daily        CLOPIDOGREL BISULFATE PO      Take 75 mg by mouth        desoximetasone 0.05 % Gel    TOPICORT     Apply topically as needed (Rosea)        IRON SUPPLEMENT PO      Take 325 mg by mouth        MAGNESIUM OXIDE PO      Take 400 mg by mouth daily        METOPROLOL SUCCINATE ER PO      Take 100 mg by mouth daily        order for DME     1 Device    Equipment being ordered: Nebulizer    Bronchiectasis without complication (H)       RANITIDINE HCL PO      Take 150 mg by mouth 2 times daily        * Notice:  This list has 2 medication(s) that are the same as other medications  prescribed for you. Read the directions carefully, and ask your doctor or other care provider to review them with you.

## 2018-09-25 NOTE — LETTER
9/25/2018       RE: Ayala Dawn  [address]  [facility] [address]     Dear Colleague,    Thank you for referring your patient, Ayala Dawn, to the [facility] at [facility]. Please see a copy of my visit note below.    Reason for Visit  Ayala Dawn is a 85 year old year old female who is being seen for RECHECK (ILD follow up )    ILD HPI    Ayala Dawn is a 85-year-old female with a history of bronchiectasis who follows up today.  She also has a mycobacterial infection with Mycobacterium abscesses.  I had her see infectious disease about this and we had a joint encounter with her this spring to discuss the options.  Unfortunately the therapy for am abscesses is quite toxic and given her advanced age  we were concerned that she would not be able to tolerate it.  After a long discussion we elected to follow her up.  She returns clinic today overall stating that her breathing is stable she continues to have issues with cough and sputum production.  She is not using any airway clearance devices or medications at this time other than occasional Mucinex.  She does note that she has been drooling a little bit recently as well.  She notes significant fatigue though does not feel that this is changed.      Current Outpatient Prescriptions   Medication     albuterol (2.5 MG/3ML) 0.083% neb solution     ATORVASTATIN CALCIUM PO     calcium carbonate (OS-VIDLA 500 MG [place]. [place]) 1250 MG tablet     CLOPIDOGREL BISULFATE PO     Cyanocobalamin (B-12) 1000 MCG CAPS     desoximetasone (TOPICORT) 0.05 % GEL     Ferrous Sulfate (IRON SUPPLEMENT PO)     MAGNESIUM OXIDE PO     METOPROLOL SUCCINATE ER PO     order for DME     RANITIDINE HCL PO     albuterol (PROAIR HFA/PROVENTIL HFA/VENTOLIN HFA) 108 (90 BASE) MCG/ACT Inhaler     No current facility-administered medications for this visit.      Allergies   Allergen Reactions     Phenobarbital Hives  "    Past Medical History:   Diagnosis Date     HTN (hypertension)        Past Surgical History:   Procedure Laterality Date     APPENDECTOMY       choleycystectomy       HYSTERECTOMY         Social History     Social History     Marital status:      Spouse name: N/A     Number of children: N/A     Years of education: N/A     Occupational History     Not on file.     Social History Main Topics     Smoking status: Former Smoker     Quit date: 1/1/1973     Smokeless tobacco: Never Used     Alcohol use Yes      Comment: rare     Drug use: No     Sexual activity: Not on file     Other Topics Concern     Not on file     Social History Narrative       Family History   Problem Relation Age of Onset     Asthma Mother        ROS Pulmonary    A complete ROS was otherwise negative except as noted in the HPI.  Vitals:    09/25/18 1350 09/25/18 1356   BP: 193/72 163/68   BP Location: Right arm Right arm   Patient Position: Chair Chair   Cuff Size: Adult Regular Adult Regular   Pulse: 71    Resp: 16    SpO2: 95%    Weight: 52.3 kg (115 lb 6.4 oz)    Height: 1.575 m (5' 2.01\")      Exam:   GENERAL APPEARANCE: Well developed, well nourished, alert, and in no apparent distress.  EYES: PERRL, EOMI  HENT: nasal mucosa with out hyperemia or edema, no nasal polyps.  MOUTH: Oral mucosa is moist, without any lesions, no tonsillar enlargement, no oropharyngeal exudate.  NECK: supple, no masses, no thyromegaly.  LYMPHATICS: No significant axillary, cervical, or supraclavicular nodes.  RESP: good air flow throughout, - no crackles, rhonchi or wheezes.  CV: Normal S1, S2, regular rhythm, normal rate, no rub, no murmur,  no gallop, no LE edema.   ABDOMEN:  Bowel sounds normal, soft, nontender, no HSM or masses.   MS: extremities normal- no clubbing, no cyanosis.  SKIN: no rash on limited exam  NEURO: Mentation intact, speech normal, normal strength and tone, normal gait and stance  PSYCH: mentation appears normal. and affect " normal/bright  Results: I have reviewed all imaging, PFTs and other relavent tests, please see below for details, PFT and imaging results were reviewed with the patient.  PFTs: Moderate restriction with normal diffusion slight drop in FVC from previous.    Assessment and plan:    85-year-old female with bronchiectasis and Mycobacterium abscesses probable infection versus colonization.  Certainly I am little concerned with a slight drop in her primary function test however she symptomatically is stable and therefore I think we can continue to follow her for the time being I will see her back in 3 months with pulmonary function test.  At this point I think we would plan for a one-year follow-up CT scan in May 2019 if her pulmonary function tests remained stable.  If she continues to drop we may need to consider doing that sooner.  Hopefully the airway clearance will help with her symptoms.  To that end I am going to prescribe a net nebulizer with albuterol nebs and give the patient a flutter valve.      CBC   Recent Labs   Lab Test  05/03/18   1142  01/30/18   1635   RBC  4.01  4.16   HGB  11.4*  11.7   HCT  35.1  36.9   PLT  267  224       Basic Metabolic Panel  Recent Labs   Lab Test  05/03/18   1142   NA  136   POTASSIUM  4.2   CHLORIDE  101   CO2  30   BUN  15   GLC  82   VIDAL  9.1       INR  No results for input(s): INR in the last 61974 hours.    PFT  PFT Latest Ref Rng & Units 9/25/2018   FVC L 1.23   FEV1 L 1.07   FVC% % 55   FEV1% % 63               Again, thank you for allowing me to participate in the care of your patient.      Sincerely,    David Morris Perlman, MD

## 2018-09-25 NOTE — PROGRESS NOTES
Reason for Visit  Ayala Dawn is a 85 year old year old female who is being seen for RECHECK (ILD follow up )    ILD HPI    Ayala Dawn is a 85-year-old female with a history of bronchiectasis who follows up today.  She also has a mycobacterial infection with Mycobacterium abscesses.  I had her see infectious disease about this and we had a joint encounter with her this spring to discuss the options.  Unfortunately the therapy for am abscesses is quite toxic and given her advanced age  we were concerned that she would not be able to tolerate it.  After a long discussion we elected to follow her up.  She returns clinic today overall stating that her breathing is stable she continues to have issues with cough and sputum production.  She is not using any airway clearance devices or medications at this time other than occasional Mucinex.  She does note that she has been drooling a little bit recently as well.  She notes significant fatigue though does not feel that this is changed.      Current Outpatient Prescriptions   Medication     albuterol (2.5 MG/3ML) 0.083% neb solution     ATORVASTATIN CALCIUM PO     calcium carbonate (OS-VIDAL 500 MG [place]. [place]) 1250 MG tablet     CLOPIDOGREL BISULFATE PO     Cyanocobalamin (B-12) 1000 MCG CAPS     desoximetasone (TOPICORT) 0.05 % GEL     Ferrous Sulfate (IRON SUPPLEMENT PO)     MAGNESIUM OXIDE PO     METOPROLOL SUCCINATE ER PO     order for DME     RANITIDINE HCL PO     albuterol (PROAIR HFA/PROVENTIL HFA/VENTOLIN HFA) 108 (90 BASE) MCG/ACT Inhaler     No current facility-administered medications for this visit.      Allergies   Allergen Reactions     Phenobarbital Hives     Past Medical History:   Diagnosis Date     HTN (hypertension)        Past Surgical History:   Procedure Laterality Date     APPENDECTOMY       choleycystectomy       HYSTERECTOMY         Social History     Social History     Marital status:      Spouse name: N/A     Number of children: N/A      "Years of education: N/A     Occupational History     Not on file.     Social History Main Topics     Smoking status: Former Smoker     Quit date: 1/1/1973     Smokeless tobacco: Never Used     Alcohol use Yes      Comment: rare     Drug use: No     Sexual activity: Not on file     Other Topics Concern     Not on file     Social History Narrative       Family History   Problem Relation Age of Onset     Asthma Mother        ROS Pulmonary    A complete ROS was otherwise negative except as noted in the HPI.  Vitals:    09/25/18 1350 09/25/18 1356   BP: 193/72 163/68   BP Location: Right arm Right arm   Patient Position: Chair Chair   Cuff Size: Adult Regular Adult Regular   Pulse: 71    Resp: 16    SpO2: 95%    Weight: 52.3 kg (115 lb 6.4 oz)    Height: 1.575 m (5' 2.01\")      Exam:   GENERAL APPEARANCE: Well developed, well nourished, alert, and in no apparent distress.  EYES: PERRL, EOMI  HENT: nasal mucosa with out hyperemia or edema, no nasal polyps.  MOUTH: Oral mucosa is moist, without any lesions, no tonsillar enlargement, no oropharyngeal exudate.  NECK: supple, no masses, no thyromegaly.  LYMPHATICS: No significant axillary, cervical, or supraclavicular nodes.  RESP: good air flow throughout, - no crackles, rhonchi or wheezes.  CV: Normal S1, S2, regular rhythm, normal rate, no rub, no murmur,  no gallop, no LE edema.   ABDOMEN:  Bowel sounds normal, soft, nontender, no HSM or masses.   MS: extremities normal- no clubbing, no cyanosis.  SKIN: no rash on limited exam  NEURO: Mentation intact, speech normal, normal strength and tone, normal gait and stance  PSYCH: mentation appears normal. and affect normal/bright  Results: I have reviewed all imaging, PFTs and other relavent tests, please see below for details, PFT and imaging results were reviewed with the patient.  PFTs: Moderate restriction with normal diffusion slight drop in FVC from previous.    Assessment and plan:    85-year-old female with " bronchiectasis and Mycobacterium abscesses probable infection versus colonization.  Certainly I am little concerned with a slight drop in her primary function test however she symptomatically is stable and therefore I think we can continue to follow her for the time being I will see her back in 3 months with pulmonary function test.  At this point I think we would plan for a one-year follow-up CT scan in May 2019 if her pulmonary function tests remained stable.  If she continues to drop we may need to consider doing that sooner.  Hopefully the airway clearance will help with her symptoms.  To that end I am going to prescribe a net nebulizer with albuterol nebs and give the patient a flutter valve.      CBC   Recent Labs   Lab Test  05/03/18   1142  01/30/18   1635   RBC  4.01  4.16   HGB  11.4*  11.7   HCT  35.1  36.9   PLT  267  224       Basic Metabolic Panel  Recent Labs   Lab Test  05/03/18   1142   NA  136   POTASSIUM  4.2   CHLORIDE  101   CO2  30   BUN  15   GLC  82   VIDAL  9.1       INR  No results for input(s): INR in the last 25590 hours.    PFT  PFT Latest Ref Rng & Units 9/25/2018   FVC L 1.23   FEV1 L 1.07   FVC% % 55   FEV1% % 63           CC:

## 2018-10-08 LAB
DLCOUNC-%PRED-PRE: 64 %
DLCOUNC-PRE: 11.65 ML/MIN/MMHG
DLCOUNC-PRED: 17.95 ML/MIN/MMHG
ERV-%PRED-PRE: 49 %
ERV-PRE: 0.33 L
ERV-PRED: 0.66 L
EXPTIME-PRE: 7.09 SEC
FEF2575-%PRED-PRE: 112 %
FEF2575-PRE: 1.52 L/SEC
FEF2575-PRED: 1.35 L/SEC
FEFMAX-%PRED-PRE: 79 %
FEFMAX-PRE: 3.1 L/SEC
FEFMAX-PRED: 3.9 L/SEC
FEV1-%PRED-PRE: 63 %
FEV1-PRE: 1.07 L
FEV1FEV6-PRE: 85 %
FEV1FEV6-PRED: 77 %
FEV1FVC-PRE: 87 %
FEV1FVC-PRED: 73 %
FEV1SVC-PRE: 101 %
FEV1SVC-PRED: 68 %
FIFMAX-PRE: 2.72 L/SEC
FVC-%PRED-PRE: 55 %
FVC-PRE: 1.23 L
FVC-PRED: 2.22 L
IC-%PRED-PRE: 40 %
IC-PRE: 0.73 L
IC-PRED: 1.82 L
VA-%PRED-PRE: 46 %
VA-PRE: 2.19 L
VC-%PRED-PRE: 42 %
VC-PRE: 1.06 L
VC-PRED: 2.48 L

## 2020-07-28 NOTE — PROGRESS NOTES
Infectious Disease Clinic Staff Note: Ms. Dawn was seen, examined, and the case was discussed with Dr. Ruiz, ID Fellow -- I agree with her interval history and examination, assessment and plan in this outpatient ID Progress note. This note reflects my observations and opinions, and the plan outlined fully reflects my approach. I have reviewed the available history, radiology, laboratory results, and reports with the Fellow.   Melolabial Transposition Flap Text: The defect edges were debeveled with a #15 scalpel blade.  Given the location of the defect and the proximity to free margins a melolabial flap was deemed most appropriate.  Using a sterile surgical marker, an appropriate melolabial transposition flap was drawn incorporating the defect.    The area thus outlined was incised deep to adipose tissue with a #15 scalpel blade.  The skin margins were undermined to an appropriate distance in all directions utilizing iris scissors.

## (undated) RX ORDER — ALBUTEROL SULFATE 0.83 MG/ML
SOLUTION RESPIRATORY (INHALATION)
Status: DISPENSED
Start: 2018-01-30